# Patient Record
Sex: FEMALE | Race: BLACK OR AFRICAN AMERICAN | NOT HISPANIC OR LATINO | Employment: OTHER | URBAN - METROPOLITAN AREA
[De-identification: names, ages, dates, MRNs, and addresses within clinical notes are randomized per-mention and may not be internally consistent; named-entity substitution may affect disease eponyms.]

---

## 2017-08-08 PROBLEM — Z12.11 SCREENING FOR COLORECTAL CANCER: Status: ACTIVE | Noted: 2017-08-08

## 2017-08-08 PROBLEM — Z12.12 SCREENING FOR COLORECTAL CANCER: Status: ACTIVE | Noted: 2017-08-08

## 2018-08-13 ENCOUNTER — HOSPITAL ENCOUNTER (OUTPATIENT)
Dept: RADIOLOGY | Facility: HOSPITAL | Age: 53
Discharge: HOME | End: 2018-08-13
Attending: NURSE PRACTITIONER
Payer: MEDICARE

## 2018-08-13 DIAGNOSIS — R16.0 LIVER MASS: ICD-10-CM

## 2018-08-13 PROCEDURE — 76705 ECHO EXAM OF ABDOMEN: CPT | Mod: TC

## 2018-08-13 PROCEDURE — 76705 ECHO EXAM OF ABDOMEN: CPT | Mod: 26,,, | Performed by: RADIOLOGY

## 2019-01-25 PROBLEM — J40 BRONCHITIS: Status: ACTIVE | Noted: 2019-01-25

## 2019-12-18 PROBLEM — K44.9 HIATAL HERNIA: Status: ACTIVE | Noted: 2019-12-18

## 2020-01-26 PROBLEM — S39.012S STRAIN OF LUMBAR PARASPINAL MUSCLE, SEQUELA: Status: ACTIVE | Noted: 2020-01-26

## 2020-05-22 PROBLEM — S39.012A STRAIN OF LUMBAR PARASPINAL MUSCLE, INITIAL ENCOUNTER: Status: ACTIVE | Noted: 2020-01-26

## 2023-03-24 ENCOUNTER — OFFICE VISIT (OUTPATIENT)
Dept: GYNECOLOGY | Facility: CLINIC | Age: 58
End: 2023-03-24
Payer: MEDICARE

## 2023-03-24 VITALS
HEIGHT: 68 IN | SYSTOLIC BLOOD PRESSURE: 120 MMHG | BODY MASS INDEX: 29.4 KG/M2 | TEMPERATURE: 98 F | OXYGEN SATURATION: 99 % | DIASTOLIC BLOOD PRESSURE: 77 MMHG | HEART RATE: 73 BPM | WEIGHT: 194 LBS | RESPIRATION RATE: 18 BRPM

## 2023-03-24 DIAGNOSIS — N39.0 FREQUENT UTI: ICD-10-CM

## 2023-03-24 DIAGNOSIS — N89.8 VAGINAL DISCHARGE: ICD-10-CM

## 2023-03-24 DIAGNOSIS — Z12.31 VISIT FOR SCREENING MAMMOGRAM: ICD-10-CM

## 2023-03-24 DIAGNOSIS — Z01.419 ENCOUNTER FOR ANNUAL ROUTINE GYNECOLOGICAL EXAMINATION: Primary | ICD-10-CM

## 2023-03-24 DIAGNOSIS — R82.90 ABNORMAL URINE ODOR: ICD-10-CM

## 2023-03-24 DIAGNOSIS — R23.2 HOT FLASHES: ICD-10-CM

## 2023-03-24 LAB
APPEARANCE UR: CLEAR
BACTERIA #/AREA URNS AUTO: ABNORMAL /HPF
BILIRUB UR QL STRIP.AUTO: NEGATIVE MG/DL
BILIRUB UR QL STRIP: NEGATIVE
CLUE CELLS VAG QL WET PREP: NORMAL
COLOR UR AUTO: ABNORMAL
GLUCOSE UR QL STRIP.AUTO: NORMAL MG/DL
GLUCOSE UR QL STRIP: NEGATIVE
HYALINE CASTS #/AREA URNS LPF: ABNORMAL /LPF
KETONES UR QL STRIP.AUTO: NEGATIVE MG/DL
KETONES UR QL STRIP: NEGATIVE
LEUKOCYTE ESTERASE UR QL STRIP.AUTO: NEGATIVE UNIT/L
LEUKOCYTE ESTERASE UR QL STRIP: NEGATIVE
MUCOUS THREADS URNS QL MICRO: ABNORMAL /LPF
NITRITE UR QL STRIP.AUTO: NEGATIVE
PH UR STRIP.AUTO: 6 [PH]
PH, POC UA: 6
POC BLOOD, URINE: POSITIVE
POC NITRATES, URINE: NEGATIVE
PROT UR QL STRIP.AUTO: NEGATIVE MG/DL
PROT UR QL STRIP: NEGATIVE
RBC #/AREA URNS AUTO: ABNORMAL /HPF
RBC UR QL AUTO: NEGATIVE UNIT/L
SP GR UR STRIP.AUTO: 1.02
SP GR UR STRIP: 1.02 (ref 1–1.03)
SQUAMOUS #/AREA URNS LPF: ABNORMAL /HPF
T VAGINALIS VAG QL WET PREP: NORMAL
UROBILINOGEN UR STRIP-ACNC: 0.2 (ref 0.1–1.1)
UROBILINOGEN UR STRIP-ACNC: NORMAL MG/DL
WBC #/AREA URNS AUTO: ABNORMAL /HPF
WBC #/AREA VAG WET PREP: NORMAL
YEAST SPEC QL WET PREP: NORMAL

## 2023-03-24 PROCEDURE — 3078F DIAST BP <80 MM HG: CPT | Mod: CPTII,,, | Performed by: NURSE PRACTITIONER

## 2023-03-24 PROCEDURE — 3078F PR MOST RECENT DIASTOLIC BLOOD PRESSURE < 80 MM HG: ICD-10-PCS | Mod: CPTII,,, | Performed by: NURSE PRACTITIONER

## 2023-03-24 PROCEDURE — 1159F PR MEDICATION LIST DOCUMENTED IN MEDICAL RECORD: ICD-10-PCS | Mod: CPTII,,, | Performed by: NURSE PRACTITIONER

## 2023-03-24 PROCEDURE — 3008F PR BODY MASS INDEX (BMI) DOCUMENTED: ICD-10-PCS | Mod: CPTII,,, | Performed by: NURSE PRACTITIONER

## 2023-03-24 PROCEDURE — 81003 URINALYSIS AUTO W/O SCOPE: CPT | Mod: PBBFAC | Performed by: NURSE PRACTITIONER

## 2023-03-24 PROCEDURE — 99386 PREV VISIT NEW AGE 40-64: CPT | Mod: S$PBB,,, | Performed by: NURSE PRACTITIONER

## 2023-03-24 PROCEDURE — 81001 URINALYSIS AUTO W/SCOPE: CPT | Performed by: NURSE PRACTITIONER

## 2023-03-24 PROCEDURE — 3074F SYST BP LT 130 MM HG: CPT | Mod: CPTII,,, | Performed by: NURSE PRACTITIONER

## 2023-03-24 PROCEDURE — 87210 SMEAR WET MOUNT SALINE/INK: CPT | Performed by: NURSE PRACTITIONER

## 2023-03-24 PROCEDURE — 3008F BODY MASS INDEX DOCD: CPT | Mod: CPTII,,, | Performed by: NURSE PRACTITIONER

## 2023-03-24 PROCEDURE — 3074F PR MOST RECENT SYSTOLIC BLOOD PRESSURE < 130 MM HG: ICD-10-PCS | Mod: CPTII,,, | Performed by: NURSE PRACTITIONER

## 2023-03-24 PROCEDURE — 99386 PR PREVENTIVE VISIT,NEW,40-64: ICD-10-PCS | Mod: S$PBB,,, | Performed by: NURSE PRACTITIONER

## 2023-03-24 PROCEDURE — 1159F MED LIST DOCD IN RCRD: CPT | Mod: CPTII,,, | Performed by: NURSE PRACTITIONER

## 2023-03-24 PROCEDURE — 99215 OFFICE O/P EST HI 40 MIN: CPT | Mod: PBBFAC | Performed by: NURSE PRACTITIONER

## 2023-03-24 RX ORDER — FLUTICASONE PROPIONATE 50 MCG
SPRAY, SUSPENSION (ML) NASAL
COMMUNITY
Start: 2022-10-02

## 2023-03-24 RX ORDER — ALBUTEROL SULFATE 90 UG/1
2 AEROSOL, METERED RESPIRATORY (INHALATION) EVERY 4 HOURS PRN
COMMUNITY
Start: 2023-01-21

## 2023-03-24 RX ORDER — ALBUTEROL SULFATE 0.83 MG/ML
2.5 SOLUTION RESPIRATORY (INHALATION) EVERY 4 HOURS PRN
COMMUNITY
Start: 2023-01-28 | End: 2024-01-28

## 2023-03-24 RX ORDER — ALBUTEROL SULFATE 0.83 MG/ML
2.5 SOLUTION RESPIRATORY (INHALATION) EVERY 4 HOURS PRN
COMMUNITY
Start: 2023-01-28

## 2023-03-24 RX ORDER — DOXYCYCLINE 100 MG/1
100 CAPSULE ORAL 2 TIMES DAILY
COMMUNITY
Start: 2023-03-15 | End: 2023-03-24

## 2023-03-24 RX ORDER — DOXYCYCLINE 100 MG/1
100 CAPSULE ORAL
COMMUNITY
Start: 2023-03-15 | End: 2023-03-24

## 2023-03-24 RX ORDER — ALBUTEROL SULFATE 5 MG/ML
2.5 SOLUTION RESPIRATORY (INHALATION) EVERY 4 HOURS PRN
COMMUNITY
Start: 2022-09-27

## 2023-03-24 RX ORDER — DEXAMETHASONE 4 MG/1
4 TABLET ORAL EVERY MORNING
COMMUNITY
Start: 2023-01-21

## 2023-03-24 RX ORDER — ALBUTEROL SULFATE 5 MG/ML
SOLUTION RESPIRATORY (INHALATION)
COMMUNITY
Start: 2022-10-18

## 2023-03-24 RX ORDER — DIAZEPAM 10 MG/1
TABLET ORAL
COMMUNITY
Start: 2023-03-23

## 2023-03-24 NOTE — PROGRESS NOTES
"  Subjective:       Patient ID: Lilian Gordon is a 58 y.o. female.    Chief Complaint:  Gynecologic Exam      History of Present Illness  The patient  here for annual exam. Pt had total hysterectomy for uterine fibroids. Denies history of abnormal paps. MG- 21 & BIRADS 2. Denies breast complaints. Pt admits to hx of frequent UTIs, today c/o cloudy urine and odor, denies dysuria. Denies pelvic pain, abnormal bleeding or discharge. Pt reports no STIs in the past and no concerns. Hx of PE in  following cholecystectomy, used coumadin for 8 months. Pt c/o night sweats 3-4x/week over last 2 months, also c/o low sex drive. She used HRT in the past prior to PE. Denies tobacco use. Dep. screening 0. Denies fly hx of breast, uterine or colon cancer. Daughter had ovarian cancer in 20s. Colonoscopy in 2017. PCP is Dr. Sandhu.    GYN & OB History  No LMP recorded (lmp unknown). Patient has had a hysterectomy.     Review of patient's allergies indicates:   Allergen Reactions    Penicillins Hives     Past Medical History:   Diagnosis Date    Anxiety     Arthritis     left knee and right shoulder    Cholelithiasis     Depression     GERD (gastroesophageal reflux disease)     Liver lesion     Liver lesion     Other pulmonary embolism without acute cor pulmonale     Popliteal cyst     Pulmonary embolism     Vitamin D deficiency      OB History    Para Term  AB Living   4 3 2 1 1 1   SAB IAB Ectopic Multiple Live Births   1       3      # Outcome Date GA Lbr Godwin/2nd Weight Sex Delivery Anes PTL Lv   4             3 SAB            2 Term            1 Term                 Review of Systems  Review of Systems    Negative except for pertinent findings for positives per HPI     Objective:    Physical Exam    /77 (BP Location: Right arm, Patient Position: Sitting, BP Method: Medium (Automatic))   Pulse 73   Temp 98.3 °F (36.8 °C) (Oral)   Resp 18   Ht 5' 8" (1.727 m)   Wt 88 kg (194 lb)   " LMP  (LMP Unknown)   SpO2 99%   BMI 29.50 kg/m²   GENERAL: Well-developed female in no acute distress.  SKIN: Normal to inspection,warm, dry and intact.  BREASTS: No masses, lumps, discharge, tenderness.  VULVA: General appearance WNL; external genitalia with no lesions or erythema.  BIMANUAL EXAM: Atrophic vaginal mucosa, Vaginal cuff intact. Uterus/Cervix surgically absent. Ovaries surgically absent. Umang adnexa reveal no evidence of masses, tenderness, or nodularity.  PSYCHIATRIC: Patient is oriented to person, place, and time. Mood and affect are normal.    Assessment:         1. Encounter for annual routine gynecological examination    2. Visit for screening mammogram    3. Vaginal discharge    4. Abnormal urine odor    5. Hot flashes    6. Frequent UTI         Plan:   Lilian was seen today for gynecologic exam.    Diagnoses and all orders for this visit:    Encounter for annual routine gynecological examination    Visit for screening mammogram  -     Mammo Digital Screening Bilat w/ Gigi; Future    Vaginal discharge  -     Wet Prep, Genital    Abnormal urine odor  -     POCT Urinalysis, Dipstick, Automated, W/O Scope  -     Ambulatory referral/consult to Urology; Future  -     Urinalysis, Reflex to Urine Culture    Hot flashes    Frequent UTI  -     POCT Urinalysis, Dipstick, Automated, W/O Scope  -     Ambulatory referral/consult to Urology; Future  -     Urinalysis, Reflex to Urine Culture    Pelvic today, pap deferred d/t hysterectomy  MG ordered  Wet prep for vaginal discharge  Urology referral for hx of frequent UTIs  Refer to GYN for hot flashes and low libido, hx of PE.  Follow up in about 1 year (around 3/24/2024) for annual exam.

## 2023-04-05 ENCOUNTER — HOSPITAL ENCOUNTER (OUTPATIENT)
Dept: RADIOLOGY | Facility: HOSPITAL | Age: 58
Discharge: HOME | End: 2023-04-05
Attending: NURSE PRACTITIONER
Payer: MEDICARE

## 2023-04-05 DIAGNOSIS — Z12.31 VISIT FOR SCREENING MAMMOGRAM: ICD-10-CM

## 2023-04-05 PROCEDURE — 77067 MAMMO DIGITAL SCREENING BILAT WITH TOMO: ICD-10-PCS | Mod: 26,,, | Performed by: RADIOLOGY

## 2023-04-05 PROCEDURE — 77067 SCR MAMMO BI INCL CAD: CPT | Mod: 26,,, | Performed by: RADIOLOGY

## 2023-04-05 PROCEDURE — 77067 SCR MAMMO BI INCL CAD: CPT | Mod: TC

## 2023-04-05 PROCEDURE — 77063 BREAST TOMOSYNTHESIS BI: CPT | Mod: 26,,, | Performed by: RADIOLOGY

## 2023-04-05 PROCEDURE — 77063 MAMMO DIGITAL SCREENING BILAT WITH TOMO: ICD-10-PCS | Mod: 26,,, | Performed by: RADIOLOGY

## 2023-06-02 ENCOUNTER — OFFICE VISIT (OUTPATIENT)
Dept: UROLOGY | Facility: CLINIC | Age: 58
End: 2023-06-02
Payer: MEDICARE

## 2023-06-02 VITALS
HEART RATE: 64 BPM | HEIGHT: 68 IN | RESPIRATION RATE: 18 BRPM | WEIGHT: 192.19 LBS | OXYGEN SATURATION: 98 % | DIASTOLIC BLOOD PRESSURE: 79 MMHG | BODY MASS INDEX: 29.13 KG/M2 | SYSTOLIC BLOOD PRESSURE: 138 MMHG

## 2023-06-02 DIAGNOSIS — N30.90 RECURRENT CYSTITIS: ICD-10-CM

## 2023-06-02 DIAGNOSIS — R31.21 ASYMPTOMATIC MICROSCOPIC HEMATURIA: ICD-10-CM

## 2023-06-02 LAB
BILIRUB SERPL-MCNC: NEGATIVE MG/DL
BLOOD URINE, POC: NORMAL
COLOR, POC UA: YELLOW
GLUCOSE UR QL STRIP: NEGATIVE
KETONES UR QL STRIP: NEGATIVE
LEUKOCYTE ESTERASE URINE, POC: NEGATIVE
NITRITE, POC UA: NEGATIVE
PH, POC UA: 6
PROTEIN, POC: NEGATIVE
SPECIFIC GRAVITY, POC UA: 1.02
UROBILINOGEN, POC UA: 0.2

## 2023-06-02 PROCEDURE — 81001 URINALYSIS AUTO W/SCOPE: CPT | Mod: PBBFAC | Performed by: UROLOGY

## 2023-06-02 PROCEDURE — 3078F PR MOST RECENT DIASTOLIC BLOOD PRESSURE < 80 MM HG: ICD-10-PCS | Mod: CPTII,,, | Performed by: UROLOGY

## 2023-06-02 PROCEDURE — 1159F PR MEDICATION LIST DOCUMENTED IN MEDICAL RECORD: ICD-10-PCS | Mod: CPTII,,, | Performed by: UROLOGY

## 2023-06-02 PROCEDURE — 99204 PR OFFICE/OUTPT VISIT, NEW, LEVL IV, 45-59 MIN: ICD-10-PCS | Mod: S$PBB,,, | Performed by: UROLOGY

## 2023-06-02 PROCEDURE — 3075F SYST BP GE 130 - 139MM HG: CPT | Mod: CPTII,,, | Performed by: UROLOGY

## 2023-06-02 PROCEDURE — 99204 OFFICE O/P NEW MOD 45 MIN: CPT | Mod: S$PBB,,, | Performed by: UROLOGY

## 2023-06-02 PROCEDURE — 3075F PR MOST RECENT SYSTOLIC BLOOD PRESS GE 130-139MM HG: ICD-10-PCS | Mod: CPTII,,, | Performed by: UROLOGY

## 2023-06-02 PROCEDURE — 1159F MED LIST DOCD IN RCRD: CPT | Mod: CPTII,,, | Performed by: UROLOGY

## 2023-06-02 PROCEDURE — 3008F BODY MASS INDEX DOCD: CPT | Mod: CPTII,,, | Performed by: UROLOGY

## 2023-06-02 PROCEDURE — 3008F PR BODY MASS INDEX (BMI) DOCUMENTED: ICD-10-PCS | Mod: CPTII,,, | Performed by: UROLOGY

## 2023-06-02 PROCEDURE — 87088 URINE BACTERIA CULTURE: CPT | Performed by: UROLOGY

## 2023-06-02 PROCEDURE — 3078F DIAST BP <80 MM HG: CPT | Mod: CPTII,,, | Performed by: UROLOGY

## 2023-06-02 PROCEDURE — 1160F PR REVIEW ALL MEDS BY PRESCRIBER/CLIN PHARMACIST DOCUMENTED: ICD-10-PCS | Mod: CPTII,,, | Performed by: UROLOGY

## 2023-06-02 PROCEDURE — 1160F RVW MEDS BY RX/DR IN RCRD: CPT | Mod: CPTII,,, | Performed by: UROLOGY

## 2023-06-02 PROCEDURE — 99215 OFFICE O/P EST HI 40 MIN: CPT | Mod: PBBFAC | Performed by: UROLOGY

## 2023-06-02 NOTE — PROGRESS NOTES
Pt seen by Dr. Duarte. Orders for urine culture will be placed by provider. Pt will be notified of results. Pt will return to clinic for cysto after CT. Pt education given both written and verbal.

## 2023-06-02 NOTE — PROGRESS NOTES
CC:  Recurrent urinary tract infections    HPI:  Lilian Gordon is a 58 y.o. female seen in consultation for recurrent urinary tract infections.  She has had problems with recurrent urinary tract infections for the past couple of years.  She states that they occur about every six months.  Symptoms are usually a foul smell to the urine and cloudy urine.  She had a positive urine culture in November of 2022 at urgent care.  She thinks she may infection March 2023 however there was no urine culture done just vaginal cultures and those were negative.  She feels like she may have a urinary tract infection today.         She was found have some microscopic hematuria on her recent urinalysis.  She had a workup for this about 20 years ago but then was not told that it was there so she assumed resolved.  She has a minimal smoking history of one pack per week for about six months to a year.      Urinalysis:  Results for orders placed or performed in visit on 06/02/23   POCT URINE DIPSTICK WITH MICROSCOPE, AUTOMATED   Result Value Ref Range    Color, UA Yellow     Spec Grav UA 1.025     pH, UA 6.0     WBC, UA Negative     Nitrite, UA Negative     Protein, POC Negative     Glucose, UA Negative     Ketones, UA Negative     Urobilinogen, UA 0.2     Bilirubin, POC Negative     Blood, UA Trace-intact         Microscopic                    1-2 epithelial cells, 1-3 RBC per HPF    Lab Results:  See HPI.    Imaging:  I reviewed her imaging over the last few years and there is nothing that shows the urinary tract.      Data Review:  Note from referring provider, Josey Suggs MD dated 24 March 2023.  Urine culture.  Urinalysis.  Vaginal cultures.    ROS:  All systems reviewed and are negative except as documented in HPI and/or Assessment and Plan.     Patient Active Problem List:     Patient Active Problem List   Diagnosis    Liver mass    Situational anxiety    GERD (gastroesophageal reflux disease)    Postmenopausal symptoms     Synovial cyst of left popliteal space    Primary osteoarthritis of left knee    Primary osteoarthritis of right shoulder    Preop cardiovascular exam    Sprain of right ankle    Tear of meniscus of left knee    Screening for colorectal cancer    Bronchitis    Hiatal hernia    Strain of lumbar paraspinal muscle, initial encounter        Past Medical History:  Past Medical History:   Diagnosis Date    Anxiety     Arthritis     left knee and right shoulder    Cholelithiasis     Depression     GERD (gastroesophageal reflux disease)     Liver lesion     Liver lesion     Other pulmonary embolism without acute cor pulmonale     Popliteal cyst     Pulmonary embolism     Vitamin D deficiency         Past Surgical History:  Past Surgical History:   Procedure Laterality Date    CHOLECYSTECTOMY      COLONOSCOPY      COLONOSCOPY N/A 08/08/2017    Procedure: COLONOSCOPY;  Surgeon: Balbina Bailey MD;  Location: [facility];  Service: Endoscopy;  Laterality: N/A;    HERNIA REPAIR      umbilical at age 8    HYSTERECTOMY  01/01/1997    fibroids    LIVER BIOPSY      OOPHORECTOMY  01/01/2010    b/l cysts    PULMONARY EMBOLISM SURGERY      UPPER GASTROINTESTINAL ENDOSCOPY          Family History:  Family History   Problem Relation Age of Onset    Stroke Mother     Cancer Father         lung     Hypertension Sister     Arthritis Sister     No Known Problems Brother     No Known Problems Maternal Aunt     No Known Problems Maternal Uncle     No Known Problems Paternal Aunt     No Known Problems Paternal Uncle     No Known Problems Maternal Grandmother     No Known Problems Maternal Grandfather     No Known Problems Paternal Grandmother     No Known Problems Paternal Grandfather     Ovarian cancer Daughter     Lupus Sister     Celiac disease Neg Hx     Cirrhosis Neg Hx     Colon cancer Neg Hx     Colon polyps Neg Hx     Crohn's disease Neg Hx     Cystic fibrosis Neg Hx     Esophageal cancer Neg Hx     Hemochromatosis Neg Hx      Inflammatory bowel disease Neg Hx     Irritable bowel syndrome Neg Hx     Liver cancer Neg Hx     Liver disease Neg Hx     Rectal cancer Neg Hx     Stomach cancer Neg Hx     Ulcerative colitis Neg Hx     Bradley's disease Neg Hx         Social History:  Social History     Socioeconomic History    Marital status: Single    Number of children: 1    Years of education: college   Tobacco Use    Smoking status: Former     Packs/day: 0.10     Years: 1.00     Pack years: 0.10     Types: Cigarettes     Start date: 1993     Quit date: 1994     Years since quittin.5    Smokeless tobacco: Never    Tobacco comments:     pk would last about 1 week   Substance and Sexual Activity    Alcohol use: Never    Drug use: Never    Sexual activity: Not Currently     Partners: Male     Birth control/protection: Surgical     Comment: with one partner for 31 years        Allergies:  Review of patient's allergies indicates:   Allergen Reactions    Penicillins Hives        Objective:  Vitals:    23 0854   BP: 138/79   Pulse: 64   Resp: 18     General:  Well developed, well nourished adult female in no acute distress  Abdomen: Soft, nontender, no masses  Extremities:  No clubbing, cyanosis, or edema  Neurologic:  Grossly intact  Musculoskeletal:  Normal tone    Assessment:  1. Recurrent cystitis  - Ambulatory referral/consult to Urology  - Urine culture  - CT Abdomen Pelvis W Wo Contrast; Future    2. Asymptomatic microscopic hematuria  - Ambulatory referral/consult to Urology  - POCT URINE DIPSTICK WITH MICROSCOPE, AUTOMATED  - Urine culture  - CT Abdomen Pelvis W Wo Contrast; Future     Plan:  Urine culture was sent today.  CT scan ordered.  We will repeat the workup for microscopic hematuria with a CT scan and cystoscopy.    Follow-up:    After CT for cystoscopy.

## 2023-06-04 LAB — BACTERIA UR CULT: NORMAL

## 2023-06-09 ENCOUNTER — OFFICE VISIT (OUTPATIENT)
Dept: GYNECOLOGY | Facility: CLINIC | Age: 58
End: 2023-06-09
Payer: MEDICARE

## 2023-06-09 VITALS — WEIGHT: 196.19 LBS | BODY MASS INDEX: 29.83 KG/M2

## 2023-06-09 DIAGNOSIS — N95.9 POSTMENOPAUSAL SYMPTOMS: ICD-10-CM

## 2023-06-09 DIAGNOSIS — R82.90 MALODOROUS URINE: Primary | ICD-10-CM

## 2023-06-09 LAB
APPEARANCE UR: CLEAR
BACTERIA #/AREA URNS AUTO: ABNORMAL /HPF
BILIRUB SERPL-MCNC: ABNORMAL MG/DL
BILIRUB UR QL STRIP.AUTO: NEGATIVE MG/DL
BLOOD URINE, POC: ABNORMAL
CLARITY, POC UA: ABNORMAL
COLOR UR: ABNORMAL
COLOR, POC UA: ABNORMAL
GLUCOSE UR QL STRIP.AUTO: NORMAL MG/DL
GLUCOSE UR QL STRIP: ABNORMAL
HYALINE CASTS #/AREA URNS LPF: ABNORMAL /LPF
KETONES UR QL STRIP.AUTO: NEGATIVE MG/DL
KETONES UR QL STRIP: ABNORMAL
LEUKOCYTE ESTERASE UR QL STRIP.AUTO: NEGATIVE UNIT/L
LEUKOCYTE ESTERASE URINE, POC: ABNORMAL
MUCOUS THREADS URNS QL MICRO: ABNORMAL /LPF
NITRITE UR QL STRIP.AUTO: NEGATIVE
NITRITE, POC UA: ABNORMAL
PH UR STRIP.AUTO: 5.5 [PH]
PH, POC UA: 5
PROT UR QL STRIP.AUTO: NEGATIVE MG/DL
PROTEIN, POC: ABNORMAL
RBC #/AREA URNS AUTO: ABNORMAL /HPF
RBC UR QL AUTO: NEGATIVE UNIT/L
SP GR UR STRIP.AUTO: 1.02
SPECIFIC GRAVITY, POC UA: 1.02
SQUAMOUS #/AREA URNS LPF: ABNORMAL /HPF
UROBILINOGEN UR STRIP-ACNC: NORMAL MG/DL
UROBILINOGEN, POC UA: 0.2
WBC #/AREA URNS AUTO: ABNORMAL /HPF

## 2023-06-09 PROCEDURE — 99211 OFF/OP EST MAY X REQ PHY/QHP: CPT | Mod: PBBFAC

## 2023-06-09 PROCEDURE — 81002 URINALYSIS NONAUTO W/O SCOPE: CPT | Mod: PBBFAC

## 2023-06-09 PROCEDURE — 81001 URINALYSIS AUTO W/SCOPE: CPT

## 2023-06-09 NOTE — PROGRESS NOTES
[facility] GYNECOLOGY CLINIC NOTE     Lilian Gordon is a 58 y.o.  presenting to GYN clinic for management of postmenopausal symptoms. She endorses hot flashes (3-4x per night), mood swings, and weight fluctuation for the past month and decreased libido for the past 4 years. She does not currently have a sexual partner. She was last sexually active 2mo ago, at which time she noted minimal vaginal dryness. She underwent menopause at age 50-51 and denies postmenopausal bleeding, as well as personal and family h/o breast, ovarian, uterine, and colon cancers. Of note, she has a h/o a provoked PE s/p cholecystectomy in , for which she took a blood thinners for 8 months.    Gynecology  Menopause at age 50-51  Denies h/o abnormal pap smears and STIs    OB History          4    Para   3    Term   2       1    AB   1    Living   1         SAB   1    IAB        Ectopic        Multiple        Live Births   3                Past Medical History:   Diagnosis Date    Anxiety     Arthritis     left knee and right shoulder    Cholelithiasis     Depression     GERD (gastroesophageal reflux disease)     Liver lesion     Liver lesion     Other pulmonary embolism without acute cor pulmonale     Popliteal cyst     Pulmonary embolism     Vitamin D deficiency       Past Surgical History:   Procedure Laterality Date    CHOLECYSTECTOMY      COLONOSCOPY      COLONOSCOPY N/A 2017    Procedure: COLONOSCOPY;  Surgeon: Balbina Bailey MD;  Location: [facility];  Service: Endoscopy;  Laterality: N/A;    HERNIA REPAIR      umbilical at age 8    HYSTERECTOMY  1997    KAILASH for fibroids    LIVER BIOPSY      OOPHORECTOMY  2010    b/l cysts    PULMONARY EMBOLISM SURGERY      UPPER GASTROINTESTINAL ENDOSCOPY        Current Outpatient Medications   Medication Instructions    albuterol (PROVENTIL) 5 mg/mL nebulizer solution SMARTSI.5 Milliliter(s) Via Nebulizer Every 4 Hours PRN    albuterol (PROVENTIL) 2.5  mg, Inhalation, Every 4 hours PRN    albuterol (PROVENTIL) 2.5 mg, Inhalation, Every 4 hours PRN    albuterol (PROVENTIL) 2.5 mg, Nebulization, Every 4 hours PRN    albuterol (PROVENTIL/VENTOLIN HFA) 90 mcg/actuation inhaler 2 puffs, Inhalation, Every 4 hours PRN    albuterol (PROVENTIL/VENTOLIN HFA) 90 mcg/actuation inhaler 2 puffs, Inhalation, Every 4 hours PRN    ascorbic acid (vitamin C) (VITAMIN C) 1,000 mg, Oral, Daily    buPROPion (WELLBUTRIN XL) 150 mg, Oral, Daily    cyanocobalamin, vitamin B-12, 1,000 mcg TbSR 1 tablet, Oral, Daily    cyclobenzaprine (FLEXERIL) 10 mg, Oral, 3 times daily PRN    dexAMETHasone (DECADRON) 4 mg, Oral, Every morning    diazePAM (VALIUM) 10 MG Tab 1 PO 1 hour prior to MRI    diclofenac sodium (VOLTAREN) 2 g, Topical (Top), 4 times daily PRN    famotidine (PEPCID) 20 mg, Oral, 2 times daily PRN    fluticasone propionate (FLONASE) 50 mcg/actuation nasal spray Each Nostril    ibuprofen (ADVIL,MOTRIN) 800 mg, Oral, 3 times daily PRN    Lactobacillus rhamnosus GG (CULTURELLE) 10 billion cell capsule 1 capsule, Oral, Daily    loratadine (CLARITIN) 10 mg, Oral, Daily    pantoprazole (PROTONIX) 40 MG tablet TAKE 1 TABLET(40 MG) BY MOUTH TWICE DAILY FOR 3 DAYS    promethazine (PHENERGAN) 12.5-25 mg, Oral, Every 6 hours PRN    sucralfate (CARAFATE) 1 gram tablet TAKE 1 TABLET(1 GRAM) BY MOUTH FOUR TIMES DAILY BEFORE MEALS AND AT NIGHT     Social History     Tobacco Use    Smoking status: Former     Packs/day: 0.10     Years: 1.00     Pack years: 0.10     Types: Cigarettes     Start date: 1993     Quit date: 1994     Years since quittin.5    Smokeless tobacco: Never    Tobacco comments:     pk would last about 1 week   Substance Use Topics    Alcohol use: Never    Drug use: Never     Review of Systems  Pertinent items are noted in HPI.     Objective:     Wt 89 kg (196 lb 3.2 oz)   LMP  (LMP Unknown)   BMI 29.83 kg/m²   Physical Exam:  Gen: Well-nourished, well-developed  female appearing stated age. Alert, cooperative, in no acute distress.  CV: regular rate  Chest: no conversational dyspnea  Abdomen: Soft, non-tender, no masses.  Incisions: Pfannenstiel incision  Extremities: Extremities normal, atraumatic, non-tender calves.    Assessment:       58 y.o.  here for medical management of postmenopausal symptoms with a h/o provoked DVT.    1. Malodorous urine  Urinalysis, Reflex to Urine Culture    POCT URINE DIPSTICK WITHOUT MICROSCOPE      2. Postmenopausal symptoms          Plan:     - due to personal h/o DVT, hormone replacement therapy is contraindicated  - given decreased libido, will trial Bupropion for treatment of vasomotor symptoms, mood swings, and loss of libido  - plan to reassess symptoms in 3 months during telemedicine visit    - patient also following with Dr. Duarte for recurrent UTIs  - UA ordered as patient endorses malodorous urine    Problem List Items Addressed This Visit          Renal/    Postmenopausal symptoms     Other Visit Diagnoses       Malodorous urine    -  Primary    Relevant Orders    Urinalysis, Reflex to Urine Culture (Completed)    POCT URINE DIPSTICK WITHOUT MICROSCOPE (Completed)          Return to clinic in 3 months for telemedicine follow-up visit    Discussed patient and plan with Dr. Jean.    Pilar Spain MD  [employer] [facility], PGY3

## 2023-06-12 RX ORDER — BUPROPION HYDROCHLORIDE 150 MG/1
150 TABLET ORAL DAILY
Qty: 30 TABLET | Refills: 5 | Status: SHIPPED | OUTPATIENT
Start: 2023-06-12 | End: 2023-12-09

## 2023-06-13 ENCOUNTER — HOSPITAL ENCOUNTER (OUTPATIENT)
Dept: RADIOLOGY | Facility: HOSPITAL | Age: 58
Discharge: HOME | End: 2023-06-13
Attending: UROLOGY
Payer: MEDICARE

## 2023-06-13 DIAGNOSIS — N30.90 RECURRENT CYSTITIS: ICD-10-CM

## 2023-06-13 DIAGNOSIS — R31.21 ASYMPTOMATIC MICROSCOPIC HEMATURIA: ICD-10-CM

## 2023-06-13 LAB
CREAT SERPL-MCNC: 0.73 MG/DL (ref 0.55–1.02)
GFR SERPLBLD CREATININE-BSD FMLA CKD-EPI: >60 MLS/MIN/1.73/M2

## 2023-06-13 PROCEDURE — 74178 CT ABD&PLV WO CNTR FLWD CNTR: CPT | Mod: TC

## 2023-06-13 PROCEDURE — 25500020 PHARM REV CODE 255: Performed by: UROLOGY

## 2023-06-13 PROCEDURE — 82565 ASSAY OF CREATININE: CPT | Performed by: UROLOGY

## 2023-06-13 RX ADMIN — IOHEXOL 100 ML: 350 INJECTION, SOLUTION INTRAVENOUS at 11:06

## 2023-06-28 ENCOUNTER — TELEPHONE (OUTPATIENT)
Dept: GYNECOLOGY | Facility: CLINIC | Age: 58
End: 2023-06-28
Payer: MEDICARE

## 2023-06-28 NOTE — TELEPHONE ENCOUNTER
I moved up patients telemed with us to 7/14/23 at 8 AM. Please call patient and let her know. That is the soonest I could squeeze her in. Thank you.     ----- Message from Zaria Coleman MA sent at 6/28/2023  2:23 PM CDT -----  Regarding: FW: Medication  This is the one I was talking about  ----- Message -----  From: Paula Nuñez MD  Sent: 6/27/2023   6:43 PM CDT  To: Zaria Coleman MA  Subject: RE: Medication                                   Please schedule for telehealth within the month. If she is not happy with it she can discontinue it and we can discuss other options at her next visit.    Paula Nuñez  [facility], PGY4    ----- Message -----  From: Zaria Coleman MA  Sent: 6/26/2023  10:57 AM CDT  To: [facility] Residents  Subject: FW: Medication                                   Please Advise.  ----- Message -----  From: Roxy Dodge  Sent: 6/26/2023  10:47 AM CDT  To: [facility] Staff  Subject: Medication                                       The patient above stated that new medication Burprotion is causing her to have cramps and nausea, stopped taking it, and would like to speak to someone about it. Please advise, Thanks.

## 2023-07-14 ENCOUNTER — CLINICAL SUPPORT (OUTPATIENT)
Dept: GYNECOLOGY | Facility: CLINIC | Age: 58
End: 2023-07-14
Payer: MEDICARE

## 2023-07-14 NOTE — PROGRESS NOTES
Called patient. She had prior checked in for telemedicine appointment, however no answer x3 on MD call. Will have office reschedule telemed appointment.

## 2023-08-17 ENCOUNTER — PROCEDURE VISIT (OUTPATIENT)
Dept: UROLOGY | Facility: CLINIC | Age: 58
End: 2023-08-17
Payer: MEDICARE

## 2023-08-17 VITALS
HEIGHT: 68 IN | WEIGHT: 196 LBS | OXYGEN SATURATION: 98 % | RESPIRATION RATE: 18 BRPM | DIASTOLIC BLOOD PRESSURE: 75 MMHG | BODY MASS INDEX: 29.7 KG/M2 | SYSTOLIC BLOOD PRESSURE: 115 MMHG | HEART RATE: 66 BPM

## 2023-08-17 DIAGNOSIS — R31.21 ASYMPTOMATIC MICROSCOPIC HEMATURIA: Primary | ICD-10-CM

## 2023-08-17 DIAGNOSIS — N30.90 RECURRENT CYSTITIS: ICD-10-CM

## 2023-08-17 LAB
BILIRUB SERPL-MCNC: NORMAL MG/DL
BLOOD URINE, POC: NORMAL
COLOR, POC UA: YELLOW
GLUCOSE UR QL STRIP: NORMAL
KETONES UR QL STRIP: NORMAL
LEUKOCYTE ESTERASE URINE, POC: NORMAL
NITRITE, POC UA: NORMAL
PH, POC UA: 6
PROTEIN, POC: NORMAL
SPECIFIC GRAVITY, POC UA: >=1.03
UROBILINOGEN, POC UA: 0.2

## 2023-08-17 PROCEDURE — 99212 PR OFFICE/OUTPT VISIT, EST, LEVL II, 10-19 MIN: ICD-10-PCS | Mod: 25,S$PBB,, | Performed by: UROLOGY

## 2023-08-17 PROCEDURE — 81001 URINALYSIS AUTO W/SCOPE: CPT | Mod: PBBFAC | Performed by: UROLOGY

## 2023-08-17 PROCEDURE — 52000 CYSTOURETHROSCOPY: CPT | Mod: PBBFAC | Performed by: UROLOGY

## 2023-08-17 PROCEDURE — 52000 PR CYSTOURETHROSCOPY: ICD-10-PCS | Mod: S$PBB,,, | Performed by: UROLOGY

## 2023-08-17 PROCEDURE — 52000 CYSTOURETHROSCOPY: CPT | Mod: S$PBB,,, | Performed by: UROLOGY

## 2023-08-17 PROCEDURE — 99212 OFFICE O/P EST SF 10 MIN: CPT | Mod: 25,S$PBB,, | Performed by: UROLOGY

## 2023-08-17 RX ORDER — LIDOCAINE HYDROCHLORIDE 20 MG/ML
JELLY TOPICAL
Status: COMPLETED | OUTPATIENT
Start: 2023-08-17 | End: 2023-08-17

## 2023-08-17 RX ORDER — CIPROFLOXACIN 500 MG/1
500 TABLET ORAL
Status: COMPLETED | OUTPATIENT
Start: 2023-08-17 | End: 2023-08-17

## 2023-08-17 RX ADMIN — CIPROFLOXACIN 500 MG: 500 TABLET ORAL at 09:08

## 2023-08-17 RX ADMIN — LIDOCAINE HYDROCHLORIDE: 20 JELLY TOPICAL at 09:08

## 2023-08-17 NOTE — PROGRESS NOTES
Pt seen by Dr. Cano. Cysto performed in clinic. Consents signed and obtained by staff. Pt received medication per procedure protocol. Ciprofloxacin HCl tablet 500 mg & LIDOcaine HCl 2% urojet administered and tolerated well. RTC 6 months. Pt education given both written and verbal.

## 2023-09-14 NOTE — PROCEDURES
CC:  Cystoscopy    HPI:  Lilian Gordon is a 58 y.o. female here for cystoscopy for hematuria.  She was found to have microscopic hematuria.  She did have a workup for this about 20 years ago which was negative.  She has a minimal smoking history.  She is also been having problems with recurrent urinary tract infections for the last couple of years.  Urine culture on 2 June 2023 was no growth.    Urinalysis:  Results for orders placed or performed in visit on 08/17/23   POCT URINE DIPSTICK WITH MICROSCOPE, AUTOMATED   Result Value Ref Range    Color, UA Yellow     Spec Grav UA >=1.030     pH, UA 6.0     WBC, UA neg     Nitrite, UA pos     Protein, POC neg     Glucose, UA neg     Ketones, UA neg     Urobilinogen, UA 0.2     Bilirubin, POC neg     Blood, UA trace-intact      Microscopic: 2-3 RBC, 3-4 squamous epithelial cells, rare bacteria per HPF      Imaging:  CT - 13 June 2023:  Negative      ROS:  All systems reviewed and are negative except as documented in HPI and/or Assessment and Plan.     Patient Active Problem List:     Patient Active Problem List   Diagnosis    Liver mass    Situational anxiety    GERD (gastroesophageal reflux disease)    Postmenopausal symptoms    Synovial cyst of left popliteal space    Primary osteoarthritis of left knee    Primary osteoarthritis of right shoulder    Preop cardiovascular exam    Sprain of right ankle    Tear of meniscus of left knee    Screening for colorectal cancer    Bronchitis    Hiatal hernia    Strain of lumbar paraspinal muscle, initial encounter        Past Medical History:  Past Medical History:   Diagnosis Date    Anxiety     Arthritis     left knee and right shoulder    Cholelithiasis     Depression     GERD (gastroesophageal reflux disease)     Liver lesion     Liver lesion     Other pulmonary embolism without acute cor pulmonale     Popliteal cyst     Pulmonary embolism     Vitamin D deficiency         Past Surgical History:  Past Surgical History:    Procedure Laterality Date    CHOLECYSTECTOMY      COLONOSCOPY      COLONOSCOPY N/A 2017    Procedure: COLONOSCOPY;  Surgeon: Balbina Bailey MD;  Location: [facility];  Service: Endoscopy;  Laterality: N/A;    HERNIA REPAIR      umbilical at age 8    HYSTERECTOMY  1997    KAILASH for fibroids    LIVER BIOPSY      OOPHORECTOMY  2010    b/l cysts    PULMONARY EMBOLISM SURGERY      UPPER GASTROINTESTINAL ENDOSCOPY          Family History:  Family History   Problem Relation Age of Onset    No Known Problems Paternal Grandfather     No Known Problems Paternal Grandmother     No Known Problems Maternal Grandmother     No Known Problems Maternal Grandfather     Cancer Father         lung     Stroke Mother     No Known Problems Brother     Hypertension Sister     Arthritis Sister     Lupus Sister     Ovarian cancer Daughter     No Known Problems Maternal Aunt     No Known Problems Maternal Uncle     No Known Problems Paternal Aunt     No Known Problems Paternal Uncle     Celiac disease Neg Hx     Cirrhosis Neg Hx     Colon cancer Neg Hx     Colon polyps Neg Hx     Crohn's disease Neg Hx     Cystic fibrosis Neg Hx     Esophageal cancer Neg Hx     Hemochromatosis Neg Hx     Inflammatory bowel disease Neg Hx     Irritable bowel syndrome Neg Hx     Liver cancer Neg Hx     Liver disease Neg Hx     Rectal cancer Neg Hx     Stomach cancer Neg Hx     Ulcerative colitis Neg Hx     Bradley's disease Neg Hx     Breast cancer Neg Hx     Uterine cancer Neg Hx         Social History:  Social History     Socioeconomic History    Marital status: Single    Number of children: 1    Years of education: college   Tobacco Use    Smoking status: Former     Current packs/day: 0.00     Average packs/day: 0.1 packs/day for 1 year (0.1 ttl pk-yrs)     Types: Cigarettes     Start date: 1993     Quit date: 1994     Years since quittin.7    Smokeless tobacco: Never    Tobacco comments:     pk would last about 1 week    Substance and Sexual Activity    Alcohol use: Never    Drug use: Never    Sexual activity: Not Currently     Partners: Male     Birth control/protection: Surgical     Comment: with one partner for 31 years     Social Determinants of Health     Stress: No Stress Concern Present (7/1/2020)    [employer] [place] [employer] - Occupational Stress Questionnaire     Feeling of Stress : Only a little        Allergies:  Review of patient's allergies indicates:   Allergen Reactions    Penicillins Hives        Objective:  Vitals:    08/17/23 0846   BP: 115/75   Pulse: 66   Resp: 18     General:  Well developed, well nourished adult female in no acute distress  Abdomen: Soft, nontender, no masses  Extremities:  No clubbing, cyanosis, or edema  Neurologic:  Grossly intact  Musculoskeletal:  Normal tone  :  External genitalia is normal without lesions.  Vagina is normal.      Cystoscopy:        - Urethral meatus:  No strictures        - Urethra:  Normal without strictures or lesions        - Bladder neck:  Normal        - Bladder:  No mucosal abnormalities        - Ureteral orifices:  On the trigone with clear efflux bilaterally    The patient tolerated the procedure well without complications.  She was given Cipro 500mg, one tablet in the clinic.   The urethra was anesthetized with 2% Lidocaine Jelly, Urojet.      Assessment:  1. Asymptomatic microscopic hematuria  - POCT URINE DIPSTICK WITH MICROSCOPE, AUTOMATED    2. Recurrent cystitis     Plan:  The workup for microscopic hematuria was negative.  We will observe the recurrent urinary tract infections at this time.  If they persist very frequently will consider suppression.    Follow-up:    Six months.

## 2024-02-22 ENCOUNTER — OFFICE VISIT (OUTPATIENT)
Dept: UROLOGY | Facility: CLINIC | Age: 59
End: 2024-02-22
Payer: MEDICARE

## 2024-02-22 VITALS
DIASTOLIC BLOOD PRESSURE: 74 MMHG | TEMPERATURE: 98 F | HEART RATE: 67 BPM | OXYGEN SATURATION: 98 % | HEIGHT: 68 IN | WEIGHT: 191 LBS | BODY MASS INDEX: 28.95 KG/M2 | RESPIRATION RATE: 19 BRPM | SYSTOLIC BLOOD PRESSURE: 117 MMHG

## 2024-02-22 DIAGNOSIS — R31.21 ASYMPTOMATIC MICROSCOPIC HEMATURIA: ICD-10-CM

## 2024-02-22 DIAGNOSIS — N30.90 RECURRENT CYSTITIS: Primary | ICD-10-CM

## 2024-02-22 LAB
BILIRUB SERPL-MCNC: NORMAL MG/DL
BLOOD URINE, POC: NORMAL
COLOR, POC UA: YELLOW
GLUCOSE UR QL STRIP: NORMAL
KETONES UR QL STRIP: NORMAL
LEUKOCYTE ESTERASE URINE, POC: NORMAL
NITRITE, POC UA: NORMAL
PH, POC UA: 6
PROTEIN, POC: NORMAL
SPECIFIC GRAVITY, POC UA: 1.01
UROBILINOGEN, POC UA: 0.2

## 2024-02-22 PROCEDURE — 1159F MED LIST DOCD IN RCRD: CPT | Mod: CPTII,,, | Performed by: NURSE PRACTITIONER

## 2024-02-22 PROCEDURE — 81001 URINALYSIS AUTO W/SCOPE: CPT | Mod: PBBFAC | Performed by: NURSE PRACTITIONER

## 2024-02-22 PROCEDURE — 1160F RVW MEDS BY RX/DR IN RCRD: CPT | Mod: CPTII,,, | Performed by: NURSE PRACTITIONER

## 2024-02-22 PROCEDURE — 99215 OFFICE O/P EST HI 40 MIN: CPT | Mod: PBBFAC | Performed by: NURSE PRACTITIONER

## 2024-02-22 PROCEDURE — 88108 CYTOPATH CONCENTRATE TECH: CPT | Mod: TC | Performed by: NURSE PRACTITIONER

## 2024-02-22 PROCEDURE — 3078F DIAST BP <80 MM HG: CPT | Mod: CPTII,,, | Performed by: NURSE PRACTITIONER

## 2024-02-22 PROCEDURE — 99213 OFFICE O/P EST LOW 20 MIN: CPT | Mod: S$PBB,,, | Performed by: NURSE PRACTITIONER

## 2024-02-22 PROCEDURE — 3074F SYST BP LT 130 MM HG: CPT | Mod: CPTII,,, | Performed by: NURSE PRACTITIONER

## 2024-02-22 PROCEDURE — 3008F BODY MASS INDEX DOCD: CPT | Mod: CPTII,,, | Performed by: NURSE PRACTITIONER

## 2024-02-22 NOTE — PROGRESS NOTES
Patient seen by Helio Wells NP. Patient instructed to RTC in 6 months with urine cytology. Urine sample will be sent to lab for urine cytology today as well.

## 2024-02-22 NOTE — PROGRESS NOTES
Chief Complaint:   Chief Complaint   Patient presents with    Follow up       HPI:  Patient is a 58-year-old female here for follow-up for recurrent urinary tract infections and hematuria.  Patient has a history of recurring UTIs over the past 9 months with symptoms of foul-smelling urine and cloudy urine in November of 2022 she was seen by an urgent care and treated.  Patient felt she had a UTI the following March but there was no urine culture positive results.  Patient also has been having persistent microscopic hematuria she did have workup over 20 years ago but felt it was resolved until her most recent visit with Dr. Diaz.  Patient does had a minimal history of smoking 1 pack a week for the past year, but has quit within the past 2 months.  Patient underwent a cystoscope with Dr. Diaz on 08/17/2023  which was negative cystoscope.  Today patient presents without any symptoms of dysuria, urinary frequency, urinary urgency, urinary hesitancy, urinary retention, urinary incontinence, gross hematuria, nocturia.  Plan is to send urine for cytology will notify patient results and follow patient for recurrent UTIs and may consider suppression therapy if UTIs recur and consistent basis.  Instructed patient RTC 6 months with urine cytology.     Allergies:  Review of patient's allergies indicates:   Allergen Reactions    Bactrim [sulfamethoxazole-trimethoprim] Hives    Penicillins Hives       Medications:  Current Outpatient Medications   Medication Sig Dispense Refill    albuterol (PROVENTIL/VENTOLIN HFA) 90 mcg/actuation inhaler Inhale 2 puffs into the lungs every 4 (four) hours as needed.      ascorbic acid, vitamin C, (VITAMIN C) 1000 MG tablet Take 1,000 mg by mouth once daily.      cyclobenzaprine (FLEXERIL) 10 MG tablet Take 10 mg by mouth 3 (three) times daily as needed for Muscle spasms.      diclofenac sodium (VOLTAREN) 1 % Gel Apply 2 g topically 4 (four) times daily as needed. 100 g 0    fluticasone  propionate (FLONASE) 50 mcg/actuation nasal spray by Each Nostril route.      ibuprofen (ADVIL,MOTRIN) 800 MG tablet Take 1 tablet (800 mg total) by mouth 3 (three) times daily as needed for Pain. 90 tablet 0    pantoprazole (PROTONIX) 40 MG tablet TAKE 1 TABLET(40 MG) BY MOUTH TWICE DAILY FOR 3 DAYS 60 tablet 3    albuterol (PROVENTIL) 2.5 mg /3 mL (0.083 %) nebulizer solution Take 2.5 mg by nebulization every 4 (four) hours as needed.      albuterol (PROVENTIL) 5 mg/mL nebulizer solution Inhale 2.5 mg into the lungs every 4 (four) hours as needed.      albuterol (PROVENTIL) 5 mg/mL nebulizer solution SMARTSI.5 Milliliter(s) Via Nebulizer Every 4 Hours PRN      albuterol (PROVENTIL/VENTOLIN HFA) 90 mcg/actuation inhaler Inhale 2 puffs into the lungs every 4 (four) hours as needed.      buPROPion (WELLBUTRIN XL) 150 MG TB24 tablet Take 1 tablet (150 mg total) by mouth once daily. 30 tablet 5    cyanocobalamin, vitamin B-12, 1,000 mcg TbSR Take 1 tablet by mouth once daily.      dexAMETHasone (DECADRON) 4 MG Tab Take 4 mg by mouth every morning.      diazePAM (VALIUM) 10 MG Tab 1 PO 1 hour prior to MRI      famotidine (PEPCID) 20 MG tablet Take 1 tablet (20 mg total) by mouth 2 (two) times daily as needed for Heartburn. (Patient not taking: Reported on 2021) 60 tablet 11    Lactobacillus rhamnosus GG (CULTURELLE) 10 billion cell capsule Take 1 capsule by mouth once daily.      loratadine (CLARITIN) 10 mg tablet Take 1 tablet (10 mg total) by mouth once daily. (Patient not taking: Reported on 3/24/2023)      promethazine (PHENERGAN) 25 MG tablet Take 0.5-1 tablets (12.5-25 mg total) by mouth every 6 (six) hours as needed for Nausea. (Patient not taking: Reported on 2023) 20 tablet 0    sucralfate (CARAFATE) 1 gram tablet TAKE 1 TABLET(1 GRAM) BY MOUTH FOUR TIMES DAILY BEFORE MEALS AND AT NIGHT 360 tablet 0     No current facility-administered medications for this visit.       Review of Systems:  General:  No fever, chills, fatigability, or weight loss.  Skin: No rashes, itching, or changes in color or texture of skin.  Chest: Denies MEDINA, cyanosis, wheezing, cough, and sputum production.  Abdomen: Appetite fine. No weight loss. Denies diarrhea, abdominal pain, hematemesis, or blood in stool.  Musculoskeletal: No joint stiffness or swelling. Denies back pain.  : As above.  All other review of systems negative.    PMH:  Past Medical History:   Diagnosis Date    Anxiety     Arthritis     left knee and right shoulder    Cholelithiasis     Depression     GERD (gastroesophageal reflux disease)     Liver lesion     Liver lesion     Other pulmonary embolism without acute cor pulmonale     Popliteal cyst     Pulmonary embolism     Vitamin D deficiency        PSH:  Past Surgical History:   Procedure Laterality Date    CHOLECYSTECTOMY      COLONOSCOPY      COLONOSCOPY N/A 08/08/2017    Procedure: COLONOSCOPY;  Surgeon: Balbina Bailey MD;  Location: [facility];  Service: Endoscopy;  Laterality: N/A;    HERNIA REPAIR      umbilical at age 8    HYSTERECTOMY  01/01/1997    KAILASH for fibroids    LIVER BIOPSY      OOPHORECTOMY  01/01/2010    b/l cysts    PULMONARY EMBOLISM SURGERY      UPPER GASTROINTESTINAL ENDOSCOPY         FamHx:  Family History   Problem Relation Age of Onset    No Known Problems Paternal Grandfather     No Known Problems Paternal Grandmother     No Known Problems Maternal Grandmother     No Known Problems Maternal Grandfather     Cancer Father         lung     Stroke Mother     No Known Problems Brother     Hypertension Sister     Arthritis Sister     Lupus Sister     Ovarian cancer Daughter     No Known Problems Maternal Aunt     No Known Problems Maternal Uncle     No Known Problems Paternal Aunt     No Known Problems Paternal Uncle     Celiac disease Neg Hx     Cirrhosis Neg Hx     Colon cancer Neg Hx     Colon polyps Neg Hx     Crohn's disease Neg Hx     Cystic fibrosis Neg Hx     Esophageal cancer Neg  Hx     Hemochromatosis Neg Hx     Inflammatory bowel disease Neg Hx     Irritable bowel syndrome Neg Hx     Liver cancer Neg Hx     Liver disease Neg Hx     Rectal cancer Neg Hx     Stomach cancer Neg Hx     Ulcerative colitis Neg Hx     Bradley's disease Neg Hx     Breast cancer Neg Hx     Uterine cancer Neg Hx        SocHx:  Social History     Socioeconomic History    Marital status: Single    Number of children: 1    Years of education: college   Tobacco Use    Smoking status: Former     Current packs/day: 0.00     Average packs/day: 0.1 packs/day for 1 year (0.1 ttl pk-yrs)     Types: Cigarettes     Start date: 1993     Quit date: 1994     Years since quittin.2    Smokeless tobacco: Never    Tobacco comments:     pk would last about 1 week   Substance and Sexual Activity    Alcohol use: Never    Drug use: Never    Sexual activity: Not Currently     Partners: Male     Birth control/protection: Surgical     Comment: with one partner for 31 years     Social Determinants of Health     Stress: No Stress Concern Present (2020)    [facility] [place] of Occupational Health - Occupational Stress Questionnaire     Feeling of Stress : Only a little       Physical Exam:  Vitals:    24 0949   BP: 117/74   Pulse: 67   Resp: 19   Temp: 97.5 °F (36.4 °C)     General: A&Ox3, no apparent distress, no deformities  Neck: No masses, normal thyroid  Lungs: CTA arlen, no use of accessory muscles  Heart: RRR, no arrhythmias  Abdomen: Soft, NT, ND, no masses, no hernias, no hepatosplenomegaly  Lymphatic: Neck and groin nodes negative  Skin: The skin is warm and dry. No jaundice.  Ext: No c/c/e.    Urinalysis:  Results for orders placed or performed in visit on 24   POCT URINE DIPSTICK WITH MICROSCOPE, AUTOMATED   Result Value Ref Range    Color, UA Yellow     Spec Grav UA 1.010     pH, UA 6.0     WBC, UA neg     Nitrite, UA neg     Protein, POC neg     Glucose, UA neg     Ketones, UA neg     Urobilinogen, UA  0.2     Bilirubin, POC neg     Blood, UA trace-intact    Microscopic urinalysis revealed trace RBCs, negative WBCs and nitrites.            Impression:  1. Recurrent cystitis  - POCT URINE DIPSTICK WITH MICROSCOPE, AUTOMATED  2. Microscopic hematuria    Plan:  Instructed patient we will send urine for cytology and notify patient results when completed, RTC 6 months with urine cytology, instructed patient develops any abnormal urologic symptoms notify clinic to be re-evaluate treated if patient has recurring confirmed UTI with culture and sensitivity more than 4-5 year we will consider suppression therapy.

## 2024-02-23 LAB
ESTROGEN SERPL-MCNC: NORMAL PG/ML
INSULIN SERPL-ACNC: NORMAL U[IU]/ML
LAB AP CLINICAL INFORMATION: NORMAL
LAB AP GROSS DESCRIPTION: NORMAL
LAB AP URINE CYTOLOGY INTERPRETATION SPECIMEN 1: NORMAL

## 2024-02-27 ENCOUNTER — OFFICE VISIT (OUTPATIENT)
Dept: UROLOGY | Facility: CLINIC | Age: 59
End: 2024-02-27
Payer: MEDICARE

## 2024-02-27 DIAGNOSIS — R31.29 OTHER MICROSCOPIC HEMATURIA: Primary | ICD-10-CM

## 2024-02-27 PROCEDURE — 1160F RVW MEDS BY RX/DR IN RCRD: CPT | Mod: CPTII,95,, | Performed by: NURSE PRACTITIONER

## 2024-02-27 PROCEDURE — 99499 UNLISTED E&M SERVICE: CPT | Mod: 95,,, | Performed by: NURSE PRACTITIONER

## 2024-02-27 PROCEDURE — 1159F MED LIST DOCD IN RCRD: CPT | Mod: CPTII,95,, | Performed by: NURSE PRACTITIONER

## 2024-02-27 NOTE — PROGRESS NOTES
Established Patient - Audio Only Telehealth Visit     The patient location is:  Home  The chief complaint leading to consultation is:  Urine cytology results  Visit type: Virtual visit with audio only (telephone)  Total time spent with patient:  20  minutes     The reason for the audio only service rather than synchronous audio virtual visit was related to technical difficulties or patient preference/necessity.     Each patient to whom I provide medical services by telemedicine is:  (1) informed of the relationship between the physician and patient and the respective role of any other health care provider with respect to management of the patient; and (2) notified that they may decline to receive medical services by telemedicine and may withdraw from such care at any time. Patient verbally consented to receive this service via voice-only telephone call.     This service was not originating from a related E/M service provided within the previous 7 days nor will  to an E/M service or procedure within the next 24 hours or my soonest available appointment.  Prevailing standard of care was able to be met in this audio-only visit.      Chief Complaint:  Urine cytology results    HPI:  Patient is a 59-year-old female on this audio virtual visit to discuss urinary cytology results.  Spoke to patient and resulted urine cytology results as listed below negative for carcinoma.  Instructed patient if develops any abnormal urologic symptoms notify clinic to be re-evaluated treated and also to keep scheduled appointment in 6 months as scheduled for urine cytology.      Allergies:  Review of patient's allergies indicates:   Allergen Reactions    Bactrim [sulfamethoxazole-trimethoprim] Hives    Penicillins Hives       Medications:  Current Outpatient Medications   Medication Sig Dispense Refill    albuterol (PROVENTIL) 2.5 mg /3 mL (0.083 %) nebulizer solution Take 2.5 mg by nebulization every 4 (four) hours as needed.       albuterol (PROVENTIL) 5 mg/mL nebulizer solution Inhale 2.5 mg into the lungs every 4 (four) hours as needed.      albuterol (PROVENTIL) 5 mg/mL nebulizer solution SMARTSI.5 Milliliter(s) Via Nebulizer Every 4 Hours PRN      albuterol (PROVENTIL/VENTOLIN HFA) 90 mcg/actuation inhaler Inhale 2 puffs into the lungs every 4 (four) hours as needed.      albuterol (PROVENTIL/VENTOLIN HFA) 90 mcg/actuation inhaler Inhale 2 puffs into the lungs every 4 (four) hours as needed.      ascorbic acid, vitamin C, (VITAMIN C) 1000 MG tablet Take 1,000 mg by mouth once daily.      buPROPion (WELLBUTRIN XL) 150 MG TB24 tablet Take 1 tablet (150 mg total) by mouth once daily. 30 tablet 5    cyanocobalamin, vitamin B-12, 1,000 mcg TbSR Take 1 tablet by mouth once daily.      cyclobenzaprine (FLEXERIL) 10 MG tablet Take 10 mg by mouth 3 (three) times daily as needed for Muscle spasms.      dexAMETHasone (DECADRON) 4 MG Tab Take 4 mg by mouth every morning.      diazePAM (VALIUM) 10 MG Tab 1 PO 1 hour prior to MRI      diclofenac sodium (VOLTAREN) 1 % Gel Apply 2 g topically 4 (four) times daily as needed. 100 g 0    famotidine (PEPCID) 20 MG tablet Take 1 tablet (20 mg total) by mouth 2 (two) times daily as needed for Heartburn. (Patient not taking: Reported on 2021) 60 tablet 11    fluticasone propionate (FLONASE) 50 mcg/actuation nasal spray by Each Nostril route.      ibuprofen (ADVIL,MOTRIN) 800 MG tablet Take 1 tablet (800 mg total) by mouth 3 (three) times daily as needed for Pain. 90 tablet 0    Lactobacillus rhamnosus GG (CULTURELLE) 10 billion cell capsule Take 1 capsule by mouth once daily.      loratadine (CLARITIN) 10 mg tablet Take 1 tablet (10 mg total) by mouth once daily. (Patient not taking: Reported on 3/24/2023)      pantoprazole (PROTONIX) 40 MG tablet TAKE 1 TABLET(40 MG) BY MOUTH TWICE DAILY FOR 3 DAYS 60 tablet 3    promethazine (PHENERGAN) 25 MG tablet Take 0.5-1 tablets (12.5-25 mg total) by mouth every  6 (six) hours as needed for Nausea. (Patient not taking: Reported on 7/14/2023) 20 tablet 0    sucralfate (CARAFATE) 1 gram tablet TAKE 1 TABLET(1 GRAM) BY MOUTH FOUR TIMES DAILY BEFORE MEALS AND AT NIGHT 360 tablet 0     No current facility-administered medications for this visit.       Review of Systems:  General: No fever, chills, fatigability, or weight loss.  Skin: No rashes, itching, or changes in color or texture of skin.  Chest: Denies MEDINA, cyanosis, wheezing, cough, and sputum production.  Abdomen: Appetite fine. No weight loss. Denies diarrhea, abdominal pain, hematemesis, or blood in stool.  Musculoskeletal: No joint stiffness or swelling. Denies back pain.  : As above.  All other review of systems negative.    PMH:  Past Medical History:   Diagnosis Date    Anxiety     Arthritis     left knee and right shoulder    Cholelithiasis     Depression     GERD (gastroesophageal reflux disease)     Liver lesion     Liver lesion     Other pulmonary embolism without acute cor pulmonale     Popliteal cyst     Pulmonary embolism     Vitamin D deficiency        PSH:  Past Surgical History:   Procedure Laterality Date    CHOLECYSTECTOMY      COLONOSCOPY      COLONOSCOPY N/A 08/08/2017    Procedure: COLONOSCOPY;  Surgeon: Balbina Bailey MD;  Location: [facility];  Service: Endoscopy;  Laterality: N/A;    HERNIA REPAIR      umbilical at age 8    HYSTERECTOMY  01/01/1997    KAILASH for fibroids    LIVER BIOPSY      OOPHORECTOMY  01/01/2010    b/l cysts    PULMONARY EMBOLISM SURGERY      UPPER GASTROINTESTINAL ENDOSCOPY         FamHx:  Family History   Problem Relation Age of Onset    No Known Problems Paternal Grandfather     No Known Problems Paternal Grandmother     No Known Problems Maternal Grandmother     No Known Problems Maternal Grandfather     Cancer Father         lung     Stroke Mother     No Known Problems Brother     Hypertension Sister     Arthritis Sister     Lupus Sister     Ovarian cancer Daughter      No Known Problems Maternal Aunt     No Known Problems Maternal Uncle     No Known Problems Paternal Aunt     No Known Problems Paternal Uncle     Celiac disease Neg Hx     Cirrhosis Neg Hx     Colon cancer Neg Hx     Colon polyps Neg Hx     Crohn's disease Neg Hx     Cystic fibrosis Neg Hx     Esophageal cancer Neg Hx     Hemochromatosis Neg Hx     Inflammatory bowel disease Neg Hx     Irritable bowel syndrome Neg Hx     Liver cancer Neg Hx     Liver disease Neg Hx     Rectal cancer Neg Hx     Stomach cancer Neg Hx     Ulcerative colitis Neg Hx     Bradley's disease Neg Hx     Breast cancer Neg Hx     Uterine cancer Neg Hx        SocHx:  Social History     Socioeconomic History    Marital status: Single    Number of children: 1    Years of education: college   Tobacco Use    Smoking status: Former     Current packs/day: 0.00     Average packs/day: 0.1 packs/day for 1 year (0.1 ttl pk-yrs)     Types: Cigarettes     Start date: 1993     Quit date: 1994     Years since quittin.2    Smokeless tobacco: Never    Tobacco comments:     pk would last about 1 week   Substance and Sexual Activity    Alcohol use: Never    Drug use: Never    Sexual activity: Not Currently     Partners: Male     Birth control/protection: Surgical     Comment: with one partner for 31 years     Social Determinants of Health     Stress: No Stress Concern Present (2020)    [employer] [place] of Occupational Health - Occupational Stress Questionnaire     Feeling of Stress : Only a little       Physical Exam:  There were no vitals filed for this visit.      Labs: Final Diagnosis     Urine, bladder:  - Negative for malignancy.       Electronically signed by Ally Body MD on 2024 at 1552  Clinical Information  Hematuria  Gross Description  1. Urine, Bladder, :   Received fresh is 60 ml of clear yellow fluid submitted for cytospin preparation.   Specimen 1 InterpretationNegative for high grade urothelial  carcinoma  Electronically signed by Ally Boyd MD on 2/23/2024 at 1552  [facility]         Impression:  Microscopic hematuria  There are no diagnoses linked to this encounter.    Plan:  Instructed patient to keep scheduled appointment in 6 months with urine cytology.  Instructed patient develops any abnormal urologic symptoms notify clinic to be re-evaluated and treated.

## 2024-03-28 ENCOUNTER — OFFICE VISIT (OUTPATIENT)
Dept: GYNECOLOGY | Facility: CLINIC | Age: 59
End: 2024-03-28
Payer: MEDICARE

## 2024-03-28 VITALS
RESPIRATION RATE: 20 BRPM | BODY MASS INDEX: 29.47 KG/M2 | SYSTOLIC BLOOD PRESSURE: 100 MMHG | OXYGEN SATURATION: 100 % | HEIGHT: 68 IN | HEART RATE: 90 BPM | TEMPERATURE: 99 F | DIASTOLIC BLOOD PRESSURE: 62 MMHG | WEIGHT: 194.44 LBS

## 2024-03-28 DIAGNOSIS — Z01.419 ENCOUNTER FOR ANNUAL ROUTINE GYNECOLOGICAL EXAMINATION: Primary | ICD-10-CM

## 2024-03-28 PROCEDURE — 99214 OFFICE O/P EST MOD 30 MIN: CPT | Mod: PBBFAC,25 | Performed by: NURSE PRACTITIONER

## 2024-03-28 PROCEDURE — 3078F DIAST BP <80 MM HG: CPT | Mod: CPTII,,, | Performed by: NURSE PRACTITIONER

## 2024-03-28 PROCEDURE — G0101 CA SCREEN;PELVIC/BREAST EXAM: HCPCS | Mod: S$PBB,,, | Performed by: NURSE PRACTITIONER

## 2024-03-28 PROCEDURE — 3074F SYST BP LT 130 MM HG: CPT | Mod: CPTII,,, | Performed by: NURSE PRACTITIONER

## 2024-03-28 PROCEDURE — G0101 CA SCREEN;PELVIC/BREAST EXAM: HCPCS | Mod: PBBFAC | Performed by: NURSE PRACTITIONER

## 2024-03-28 PROCEDURE — 1159F MED LIST DOCD IN RCRD: CPT | Mod: CPTII,,, | Performed by: NURSE PRACTITIONER

## 2024-03-28 RX ORDER — HYDROXYZINE PAMOATE 25 MG/1
25 CAPSULE ORAL
COMMUNITY
Start: 2024-01-31 | End: 2024-05-22

## 2024-03-28 NOTE — PROGRESS NOTES
[facility] -  Gynecology / Women's Health Clinic    Subjective:       Patient ID: Lilian Gordon is a 59 y.o. female.    Chief Complaint:  Gynecologic Exam    History of Present Illness  The patient  here for annual exam. Pt had total hysterectomy for uterine fibroids. Denies history of abnormal paps. MG- 23 & BIRADS 1. Denies breast complaints. Denies pelvic pain, abnormal bleeding or discharge. Pt reports no STIs in the past and no concerns. Pt followed by urology for frequent UTIs. Hx of PE in  following cholecystectomy, used coumadin for 8 months. Pt c/o daily night sweats around 2-3 AM and hot flashes also occur some days. She used HRT in the past prior to PE. Seen by GYN in , started on Buproprion, no longer taking. Denies tobacco use. Dep. screening 0. Denies fly hx of breast, uterine or colon cancer. Daughter had ovarian cancer in 20s. Colonoscopy in 2017. PCP is Dr. Sandhu.     GYN & OB History  No LMP recorded (lmp unknown). Patient has had a hysterectomy.       Review of patient's allergies indicates:   Allergen Reactions    Bactrim [sulfamethoxazole-trimethoprim] Hives    Penicillins Hives     Past Medical History:   Diagnosis Date    Anxiety     Arthritis     left knee and right shoulder    Cholelithiasis     Depression     GERD (gastroesophageal reflux disease)     Liver lesion     Liver lesion     Other pulmonary embolism without acute cor pulmonale     Popliteal cyst     Pulmonary embolism     Vitamin D deficiency      OB History    Para Term  AB Living   4 3 2 1 1 1   SAB IAB Ectopic Multiple Live Births   1       3      # Outcome Date GA Lbr Godwin/2nd Weight Sex Delivery Anes PTL Lv   4       Vag-Spont      3 SAB            2 Term      Vag-Spont      1 Term      Vag-Spont           Review of Systems  Review of Systems    Negative except for pertinent findings for positives per HPI     Objective:    Physical Exam    /62 (BP  "Location: Left arm, Patient Position: Sitting, BP Method: Medium (Automatic))   Pulse 90   Temp 98.8 °F (37.1 °C) (Oral)   Resp 20   Ht 5' 8" (1.727 m)   Wt 88.2 kg (194 lb 7.1 oz)   LMP  (LMP Unknown)   SpO2 100%   BMI 29.57 kg/m²   GENERAL: Well-developed female in no acute distress.  SKIN: Normal to inspection,warm, dry and intact.  BREASTS: No rashes or erythema. No masses, lumps, discharge, tenderness.  VULVA: General appearance WNL; external genitalia with no lesions or erythema.  BIMANUAL EXAM: Vaginal mucosa/vault atrophic Vaginal cuff intact. Uterus/Cervix surgically absent. Ovaries surgically absent. Umang adnexa reveal no tenderness.  PSYCHIATRIC: Patient is oriented to person, place, and time. Mood and affect are normal.    Assessment:         ICD-10-CM ICD-9-CM   1. Encounter for annual routine gynecological examination  Z01.419 V72.31     Plan:   Lilian was seen today for gynecologic exam.    Diagnoses and all orders for this visit:    Encounter for annual routine gynecological examination    Pelvic today, pap deferred d/t hysterectomy  Schedule with GYN for hot flashes  Keep MG appt.  Follow up in about 1 year (around 3/28/2025) for annual exam.    "

## 2024-04-08 ENCOUNTER — HOSPITAL ENCOUNTER (OUTPATIENT)
Dept: RADIOLOGY | Facility: HOSPITAL | Age: 59
Discharge: HOME | End: 2024-04-08
Attending: INTERNAL MEDICINE
Payer: MEDICARE

## 2024-04-08 DIAGNOSIS — Z12.31 SCREENING MAMMOGRAM FOR BREAST CANCER: ICD-10-CM

## 2024-04-08 PROCEDURE — 77067 SCR MAMMO BI INCL CAD: CPT | Mod: 26,,, | Performed by: RADIOLOGY

## 2024-04-08 PROCEDURE — 77063 BREAST TOMOSYNTHESIS BI: CPT | Mod: TC

## 2024-04-08 PROCEDURE — 77063 BREAST TOMOSYNTHESIS BI: CPT | Mod: 26,,, | Performed by: RADIOLOGY

## 2024-05-22 ENCOUNTER — OFFICE VISIT (OUTPATIENT)
Dept: GYNECOLOGY | Facility: CLINIC | Age: 59
End: 2024-05-22
Payer: MEDICARE

## 2024-05-22 VITALS
OXYGEN SATURATION: 96 % | WEIGHT: 194.69 LBS | DIASTOLIC BLOOD PRESSURE: 71 MMHG | HEART RATE: 56 BPM | SYSTOLIC BLOOD PRESSURE: 111 MMHG | HEIGHT: 68 IN | BODY MASS INDEX: 29.51 KG/M2 | TEMPERATURE: 98 F

## 2024-05-22 DIAGNOSIS — N95.2 VAGINAL ATROPHY: Primary | ICD-10-CM

## 2024-05-22 DIAGNOSIS — N95.1 INSOMNIA ASSOCIATED WITH MENOPAUSE: ICD-10-CM

## 2024-05-22 LAB
BILIRUB SERPL-MCNC: NEGATIVE MG/DL
BLOOD URINE, POC: NORMAL
CLARITY, POC UA: NORMAL
COLOR, POC UA: YELLOW
GLUCOSE UR QL STRIP: NEGATIVE
KETONES UR QL STRIP: NEGATIVE
LEUKOCYTE ESTERASE URINE, POC: NEGATIVE
NITRITE, POC UA: NEGATIVE
PH, POC UA: 6
PROTEIN, POC: NORMAL
SPECIFIC GRAVITY, POC UA: >=1.03
UROBILINOGEN, POC UA: NORMAL

## 2024-05-22 PROCEDURE — 81002 URINALYSIS NONAUTO W/O SCOPE: CPT | Mod: PBBFAC

## 2024-05-22 PROCEDURE — 99214 OFFICE O/P EST MOD 30 MIN: CPT | Mod: PBBFAC

## 2024-05-22 RX ORDER — ESTRADIOL 10 UG/1
10 INSERT VAGINAL DAILY
Qty: 18 TABLET | Refills: 11 | Status: SHIPPED | OUTPATIENT
Start: 2024-05-22 | End: 2025-05-22

## 2024-05-22 RX ORDER — ERGOCALCIFEROL 1.25 MG/1
50000 CAPSULE ORAL
COMMUNITY

## 2024-05-22 RX ORDER — HYDROXYZINE PAMOATE 25 MG/1
25 CAPSULE ORAL NIGHTLY
Qty: 60 CAPSULE | Refills: 11 | Status: SHIPPED | OUTPATIENT
Start: 2024-05-22

## 2024-05-22 NOTE — ASSESSMENT & PLAN NOTE
Rx for vagifem to be used 3 times per week.  Recommended she stops bathing and vaginal cleaning.  Proper hygiene discussed.

## 2024-05-22 NOTE — PROGRESS NOTES
[facility] GYNECOLOGY CLINIC NOTE     Lilian Gordon is a 59 y.o.  presenting to GYN clinic for discussion of HRT.  Referred by Josey.  Previously on HRT but had PE after lap lauro. Her main complaints are insomnia with trouble staying asleep. +night sweats.    +vaginal dryness - daily hot baths, washes inside vagina, epsom salt baths  Denie bladder complaints.  Occas diarrhea with abd cramps, better with defecation.    Gynecology  OB History          4    Para   3    Term   2       1    AB   1    Living   1         SAB   1    IAB        Ectopic        Multiple        Live Births   3                Past Medical History:   Diagnosis Date    Anxiety     Arthritis     left knee and right shoulder    Cholelithiasis     Depression     GERD (gastroesophageal reflux disease)     Liver lesion     Liver lesion     Other pulmonary embolism without acute cor pulmonale     Popliteal cyst     Pulmonary embolism     Vitamin D deficiency       Past Surgical History:   Procedure Laterality Date    CHOLECYSTECTOMY      COLONOSCOPY      COLONOSCOPY N/A 2017    Procedure: COLONOSCOPY;  Surgeon: Balbina Bailey MD;  Location: [facility];  Service: Endoscopy;  Laterality: N/A;    HERNIA REPAIR      umbilical at age 8    HYSTERECTOMY  1997    KAILASH for fibroids    LIVER BIOPSY      OOPHORECTOMY  2010    b/l cysts    PULMONARY EMBOLISM SURGERY      UPPER GASTROINTESTINAL ENDOSCOPY          Social History     Tobacco Use    Smoking status: Former     Current packs/day: 0.00     Average packs/day: 0.1 packs/day for 1 year (0.1 ttl pk-yrs)     Types: Cigarettes     Start date: 1993     Quit date: 1994     Years since quittin.4    Smokeless tobacco: Never    Tobacco comments:     pk would last about 1 week   Substance Use Topics    Alcohol use: Never    Drug use: Never       Review of Systems  Pertinent items are noted in HPI.     Objective:     /71 (BP Location: Left arm)   Pulse  "(!) 56 Comment: 61 but went down to 56, pt is asymptomatic  Temp 97.7 °F (36.5 °C)   Ht 5' 8" (1.727 m)   Wt 88.3 kg (194 lb 10.7 oz)   LMP  (LMP Unknown)   SpO2 96%   BMI 29.60 kg/m²   Physical Exam:  Gen: Well-nourished, well-developed female appearing stated age. Alert, cooperative, in no acute distress.        Assessment:       59 y.o.  here for:  1. Vaginal atrophy  estradioL (VAGIFEM) 10 mcg Tab    POCT URINE DIPSTICK WITHOUT MICROSCOPE      2. Insomnia associated with menopause  hydrOXYzine pamoate (VISTARIL) 25 MG Cap             Plan:         Problem List Items Addressed This Visit          Renal/    Vaginal atrophy - Primary     Rx for vagifem to be used 3 times per week.  Recommended she stops bathing and vaginal cleaning.  Proper hygiene discussed.         Relevant Medications    estradioL (VAGIFEM) 10 mcg Tab    Other Relevant Orders    POCT URINE DIPSTICK WITHOUT MICROSCOPE (Completed)    Insomnia associated with menopause     Will start Vistaril 25mg - 50mg nightly for sleep.  We discussed good sleep hygiene with restriction of liquids many hours before bedtime.         Relevant Medications    hydrOXYzine pamoate (VISTARIL) 25 MG Cap       Return to clinic prn.  Keep appts with Josey for wellness.  Return to us if symptoms not improve over next few months.      "

## 2024-05-22 NOTE — ASSESSMENT & PLAN NOTE
Will start Vistaril 25mg - 50mg nightly for sleep.  We discussed good sleep hygiene with restriction of liquids many hours before bedtime.

## 2025-04-01 ENCOUNTER — OFFICE VISIT (OUTPATIENT)
Dept: GYNECOLOGY | Facility: CLINIC | Age: 60
End: 2025-04-01
Payer: MEDICARE

## 2025-04-01 VITALS
TEMPERATURE: 99 F | RESPIRATION RATE: 20 BRPM | DIASTOLIC BLOOD PRESSURE: 67 MMHG | BODY MASS INDEX: 27.56 KG/M2 | HEIGHT: 68 IN | HEART RATE: 72 BPM | SYSTOLIC BLOOD PRESSURE: 125 MMHG | OXYGEN SATURATION: 100 % | WEIGHT: 181.81 LBS

## 2025-04-01 DIAGNOSIS — Z01.419 ENCOUNTER FOR ANNUAL ROUTINE GYNECOLOGICAL EXAMINATION: Primary | ICD-10-CM

## 2025-04-01 DIAGNOSIS — Z12.31 VISIT FOR SCREENING MAMMOGRAM: ICD-10-CM

## 2025-04-01 PROCEDURE — 99214 OFFICE O/P EST MOD 30 MIN: CPT | Mod: PBBFAC | Performed by: NURSE PRACTITIONER

## 2025-04-01 PROCEDURE — G0101 CA SCREEN;PELVIC/BREAST EXAM: HCPCS | Mod: PBBFAC | Performed by: NURSE PRACTITIONER

## 2025-04-01 NOTE — PROGRESS NOTES
[facility] -  Gynecology / Women's Health Clinic    Subjective:       Patient ID: Lilian Gordon is a 60 y.o. female.    Chief Complaint:  Gynecologic Exam    History of Present Illness  The patient  here for annual exam. Pt had total hysterectomy for uterine fibroids. Denies history of abnormal paps. MG- 24 & BIRADS 1. Denies breast complaints. Denies pelvic pain, abnormal bleeding or discharge. Pt reports no STIs in the past and no concerns. Pt followed by urology for frequent UTIs, last seen in . Recent completion of antibiotics for UTI, symptoms have resolved. Hx of PE in  following cholecystectomy, used coumadin for 8 months. Pt was seen by GYN in  and given Vagifem for vaginal dryness and Vistaril for insomnia, not currently taking. She used HRT in the past prior to PE. Pt c/o daily hot flashes and low libido, she expressed concern as to why she can't be treated with hormones as the PE was since . Denies tobacco use. Dep. screening 0. Denies fly hx of breast, uterine or colon cancer. Daughter had ovarian cancer in 20s. Colonoscopy in 2017. PCP is Dr. Sandhu.     GYN & OB History  No LMP recorded (lmp unknown). Patient has had a hysterectomy.     Review of patient's allergies indicates:   Allergen Reactions    Bactrim [sulfamethoxazole-trimethoprim] Hives    Penicillins Hives     Past Medical History:   Diagnosis Date    Anxiety     Arthritis     left knee and right shoulder    Cholelithiasis     Depression     GERD (gastroesophageal reflux disease)     Liver lesion     Liver lesion     Other pulmonary embolism without acute cor pulmonale     Popliteal cyst     Pulmonary embolism     Vitamin D deficiency      OB History    Para Term  AB Living   4 3 2 1 1 1   SAB IAB Ectopic Multiple Live Births   1    3      # Outcome Date GA Lbr Godwin/2nd Weight Sex Type Anes PTL Lv   4       Vag-Spont      3 SAB            2 Term      Vag-Spont      1 Term  "     Vag-Spont           Review of Systems  Review of Systems    Negative except for pertinent findings for positives per HPI     Objective:    Physical Exam    /67 (BP Location: Right arm, Patient Position: Sitting)   Pulse 72   Temp 98.6 °F (37 °C) (Oral)   Resp 20   Ht 5' 8" (1.727 m)   Wt 82.5 kg (181 lb 12.8 oz)   LMP  (LMP Unknown)   SpO2 100%   BMI 27.64 kg/m²   GENERAL: Well-developed female in no acute distress.  SKIN: Normal to inspection,warm, dry and intact.  BREASTS: No rashes or erythema. No masses, lumps, discharge, tenderness.  VULVA: General appearance WNL; external genitalia with no lesions or erythema.  BIMANUAL EXAM: Vaginal mucosa/vault pale, Vaginal cuff intact. Uterus/Cervix surgically absent. Ovaries surgically absent. Umang adnexa reveal no tenderness.  PSYCHIATRIC: Patient is oriented to person, place, and time. Mood and affect are normal.    Assessment:         ICD-10-CM ICD-9-CM   1. Encounter for annual routine gynecological examination  Z01.419 V72.31   2. Visit for screening mammogram  Z12.31 V76.12     Plan:   Lilian Dorsey" was seen today for gynecologic exam.    Diagnoses and all orders for this visit:    Encounter for annual routine gynecological examination    Visit for screening mammogram  -     Mammo Digital Screening Bilat w/ Gigi (XPD); Future    Pelvic today, pap deferred d/t hysterectomy  MG ordered  Will schedule with GYN for hot flashes and low libido, discussed risk associated with use of HRT and hx of PE  Schedule f/u with Urology  Follow up in about 1 year (around 4/1/2026) for annual exam.    "

## 2025-04-10 ENCOUNTER — HOSPITAL ENCOUNTER (OUTPATIENT)
Dept: RADIOLOGY | Facility: HOSPITAL | Age: 60
Discharge: HOME | End: 2025-04-10
Attending: NURSE PRACTITIONER
Payer: MEDICARE

## 2025-04-10 DIAGNOSIS — Z12.31 VISIT FOR SCREENING MAMMOGRAM: ICD-10-CM

## 2025-04-10 PROCEDURE — 77063 BREAST TOMOSYNTHESIS BI: CPT | Mod: 26,,, | Performed by: RADIOLOGY

## 2025-04-10 PROCEDURE — 77067 SCR MAMMO BI INCL CAD: CPT | Mod: 26,,, | Performed by: RADIOLOGY

## 2025-04-10 PROCEDURE — 77067 SCR MAMMO BI INCL CAD: CPT | Mod: TC

## 2025-04-17 ENCOUNTER — OFFICE VISIT (OUTPATIENT)
Dept: UROLOGY | Facility: CLINIC | Age: 60
End: 2025-04-17
Payer: MEDICARE

## 2025-04-17 VITALS
SYSTOLIC BLOOD PRESSURE: 112 MMHG | DIASTOLIC BLOOD PRESSURE: 71 MMHG | WEIGHT: 180.75 LBS | HEIGHT: 68 IN | TEMPERATURE: 98 F | BODY MASS INDEX: 27.39 KG/M2 | HEART RATE: 71 BPM | OXYGEN SATURATION: 98 % | RESPIRATION RATE: 20 BRPM

## 2025-04-17 DIAGNOSIS — N30.90 RECURRENT CYSTITIS: Primary | ICD-10-CM

## 2025-04-17 DIAGNOSIS — R31.29 OTHER MICROSCOPIC HEMATURIA: ICD-10-CM

## 2025-04-17 LAB
BILIRUB SERPL-MCNC: NEGATIVE MG/DL
BLOOD URINE, POC: NORMAL
COLOR, POC UA: NORMAL
GLUCOSE UR QL STRIP: NEGATIVE
KETONES UR QL STRIP: NEGATIVE
LEUKOCYTE ESTERASE URINE, POC: NEGATIVE
NITRITE, POC UA: NEGATIVE
PH, POC UA: 6.5
PROTEIN, POC: NEGATIVE
SPECIFIC GRAVITY, POC UA: 1.02
UROBILINOGEN, POC UA: 0.2

## 2025-04-17 PROCEDURE — 3078F DIAST BP <80 MM HG: CPT | Mod: CPTII,,, | Performed by: NURSE PRACTITIONER

## 2025-04-17 PROCEDURE — 99213 OFFICE O/P EST LOW 20 MIN: CPT | Mod: S$PBB,,, | Performed by: NURSE PRACTITIONER

## 2025-04-17 PROCEDURE — 1159F MED LIST DOCD IN RCRD: CPT | Mod: CPTII,,, | Performed by: NURSE PRACTITIONER

## 2025-04-17 PROCEDURE — 99214 OFFICE O/P EST MOD 30 MIN: CPT | Mod: PBBFAC | Performed by: NURSE PRACTITIONER

## 2025-04-17 PROCEDURE — 3008F BODY MASS INDEX DOCD: CPT | Mod: CPTII,,, | Performed by: NURSE PRACTITIONER

## 2025-04-17 PROCEDURE — 3074F SYST BP LT 130 MM HG: CPT | Mod: CPTII,,, | Performed by: NURSE PRACTITIONER

## 2025-04-17 PROCEDURE — 1160F RVW MEDS BY RX/DR IN RCRD: CPT | Mod: CPTII,,, | Performed by: NURSE PRACTITIONER

## 2025-04-17 PROCEDURE — 81001 URINALYSIS AUTO W/SCOPE: CPT | Mod: PBBFAC | Performed by: NURSE PRACTITIONER

## 2025-04-17 NOTE — PROGRESS NOTES
Chief Complaint:   Chief Complaint   Patient presents with     Recurrent cystitis & microscopic hematuria with cytology       HPI:   Patient is a 58-year-old female here for follow-up for recurrent urinary tract infections and hematuria.   Patient has a history of recurring UTIs with symptoms of foul-smelling urine and cloudy urine in November of 2022 she was seen by an urgent care and treated.    Patient due to persistent microscopic hematuria.    Patient's history of smoking.  Patient underwent a cystoscope with Dr. Diaz on 08/17/2023  which was negative cystoscope.    Today patient presents without any symptoms of dysuria, urinary frequency, urinary urgency, urinary hesitancy, urinary retention, urinary incontinence, gross hematuria, nocturia.       Allergies:  Review of patient's allergies indicates:   Allergen Reactions    Bactrim [sulfamethoxazole-trimethoprim] Hives    Penicillins Hives       Medications:  Current Medications[1]    Review of Systems:  General: No fever, chills, fatigability, or weight loss.  Skin: No rashes, itching, or changes in color or texture of skin.  Chest: Denies MEDINA, cyanosis, wheezing, cough, and sputum production.  Abdomen: Appetite fine. No weight loss. Denies diarrhea, abdominal pain, hematemesis, or blood in stool.  Musculoskeletal: No joint stiffness or swelling. Denies back pain.  : As above.  All other review of systems negative.    PMH:  Past Medical History:   Diagnosis Date    Anxiety     Arthritis     left knee and right shoulder    Cholelithiasis     Depression     GERD (gastroesophageal reflux disease)     Liver lesion     Liver lesion     Other pulmonary embolism without acute cor pulmonale     Popliteal cyst     Pulmonary embolism     Vitamin D deficiency        PSH:  Past Surgical History:   Procedure Laterality Date    CHOLECYSTECTOMY      COLONOSCOPY      COLONOSCOPY N/A 08/08/2017    Procedure: COLONOSCOPY;  Surgeon: Balbina Bailey MD;  Location: [facility]  ENDO;  Service: Endoscopy;  Laterality: N/A;    HERNIA REPAIR      umbilical at age 8    HYSTERECTOMY  01/01/1997    KAILASH for fibroids    LIVER BIOPSY      OOPHORECTOMY  01/01/2010    b/l cysts    PULMONARY EMBOLISM SURGERY      UPPER GASTROINTESTINAL ENDOSCOPY         FamHx:  Family History   Problem Relation Name Age of Onset    No Known Problems Paternal Grandfather      No Known Problems Paternal Grandmother      No Known Problems Maternal Grandmother      No Known Problems Maternal Grandfather      Cancer Father          lung     Stroke Mother      No Known Problems Brother      Hypertension Sister 2     Arthritis Sister 2     Lupus Sister 1     Ovarian cancer Daughter Paz     No Known Problems Maternal Aunt      No Known Problems Maternal Uncle      No Known Problems Paternal Aunt      No Known Problems Paternal Uncle      Celiac disease Neg Hx      Cirrhosis Neg Hx      Colon cancer Neg Hx      Colon polyps Neg Hx      Crohn's disease Neg Hx      Cystic fibrosis Neg Hx      Esophageal cancer Neg Hx      Hemochromatosis Neg Hx      Inflammatory bowel disease Neg Hx      Irritable bowel syndrome Neg Hx      Liver cancer Neg Hx      Liver disease Neg Hx      Rectal cancer Neg Hx      Stomach cancer Neg Hx      Ulcerative colitis Neg Hx      Bradley's disease Neg Hx      Breast cancer Neg Hx      Uterine cancer Neg Hx         SocHx:  Social History[2]    Physical Exam:  Vitals:    04/17/25 0953   BP: 112/71   Pulse: 71   Resp: 20   Temp: 97.7 °F (36.5 °C)     General: A&Ox3, no apparent distress, no deformities  Neck: No masses, normal thyroid  Lungs: CTA arlen, no use of accessory muscles  Heart: RRR, no arrhythmias  Abdomen: Soft, NT, ND, no masses, no hernias, no hepatosplenomegaly  Lymphatic: Neck and groin nodes negative  Skin: The skin is warm and dry. No jaundice.  Ext: No c/c/e.      Urinalysis:  Results for orders placed or performed in visit on 04/17/25   POCT URINE DIPSTICK WITH MICROSCOPE, AUTOMATED    Result Value Ref Range    Color, UA Dark Yellow     Spec Grav UA 1.020     pH, UA 6.5     WBC, UA Negative     Nitrite, UA Negative     Protein, POC Negative     Glucose, UA Negative     Ketones, UA Negative     Urobilinogen, UA 0.2     Bilirubin, POC Negative     Blood, UA Trace-intact    Microscopic urinalysis WBCs, negative trace intact 1-2 per HPF, nitrites negative, negative bacteria.      Impression:  1. Recurrent cystitis  - POCT URINE DIPSTICK WITH MICROSCOPE, AUTOMATED    2. Other microscopic hematuria  - POCT URINE DIPSTICK WITH MICROSCOPE, AUTOMATED      Plan:  Instructed patient to schedule patient for 1 week audio virtual visit to discuss urine cytology results.  RTC one year urine cytology.  Instructed patient if develops any abnormal urologic symptoms notify clinic to be re-evaluate treated or during after hours go to emergency room versus urgent here.                           [facility]         [1]   Current Outpatient Medications   Medication Sig Dispense Refill    albuterol (PROVENTIL) 2.5 mg /3 mL (0.083 %) nebulizer solution Take 2.5 mg by nebulization every 4 (four) hours as needed.      albuterol (PROVENTIL) 5 mg/mL nebulizer solution Inhale 2.5 mg into the lungs every 4 (four) hours as needed.      ascorbic acid, vitamin C, (VITAMIN C) 1000 MG tablet Take 1,000 mg by mouth once daily.      ergocalciferol (VITAMIN D2) 50,000 unit Cap Take 50,000 Units by mouth every 7 days.      ibuprofen (ADVIL,MOTRIN) 800 MG tablet Take 1 tablet (800 mg total) by mouth 3 (three) times daily as needed for Pain. 90 tablet 0    pantoprazole (PROTONIX) 40 MG tablet TAKE 1 TABLET(40 MG) BY MOUTH TWICE DAILY FOR 3 DAYS 60 tablet 3    albuterol (PROVENTIL) 5 mg/mL nebulizer solution SMARTSI.5 Milliliter(s) Via Nebulizer Every 4 Hours PRN (Patient not taking: Reported on 2024)      buPROPion (WELLBUTRIN XL) 150 MG TB24 tablet Take 1 tablet (150 mg total) by mouth once daily. 30 tablet 5    cyclobenzaprine  (FLEXERIL) 10 MG tablet Take 10 mg by mouth 3 (three) times daily as needed for Muscle spasms. (Patient not taking: Reported on 2024)      diazePAM (VALIUM) 10 MG Tab 1 PO 1 hour prior to MRI (Patient not taking: Reported on 2025)      diclofenac sodium (VOLTAREN) 1 % Gel Apply 2 g topically 4 (four) times daily as needed. (Patient not taking: Reported on 3/28/2024) 100 g 0    estradioL (VAGIFEM) 10 mcg Tab Place 1 tablet (10 mcg total) vaginally once daily. (Patient not taking: Reported on 2025) 18 tablet 11    famotidine (PEPCID) 20 MG tablet Take 1 tablet (20 mg total) by mouth 2 (two) times daily as needed for Heartburn. (Patient not taking: Reported on 2021) 60 tablet 11    fluticasone propionate (FLONASE) 50 mcg/actuation nasal spray by Each Nostril route. (Patient not taking: Reported on 2024)      promethazine (PHENERGAN) 25 MG tablet Take 0.5-1 tablets (12.5-25 mg total) by mouth every 6 (six) hours as needed for Nausea. (Patient not taking: Reported on 2025) 20 tablet 0     No current facility-administered medications for this visit.   [2]   Social History  Socioeconomic History    Marital status: Single    Number of children: 1    Years of education: college   Tobacco Use    Smoking status: Former     Current packs/day: 0.00     Average packs/day: 0.1 packs/day for 1 year (0.1 ttl pk-yrs)     Types: Cigarettes     Start date: 1993     Quit date: 1994     Years since quittin.3    Smokeless tobacco: Never    Tobacco comments:     pk would last about 1 week   Substance and Sexual Activity    Alcohol use: Never    Drug use: Never    Sexual activity: Not Currently     Partners: Male     Birth control/protection: Surgical     Comment: with one partner for 31 years     Social Drivers of Health     Stress: No Stress Concern Present (2020)    [employer] - Occupational Stress Questionnaire     Feeling of Stress : Only a little

## 2025-04-25 ENCOUNTER — OFFICE VISIT (OUTPATIENT)
Dept: UROLOGY | Facility: CLINIC | Age: 60
End: 2025-04-25
Payer: MEDICARE

## 2025-04-25 DIAGNOSIS — R31.21 ASYMPTOMATIC MICROSCOPIC HEMATURIA: Primary | ICD-10-CM

## 2025-04-25 NOTE — PROGRESS NOTES
Established Patient - Audio Only Telehealth Visit     The patient location is:  Home  The chief complaint leading to consultation is:  Microscopic hematuria  Visit type: Virtual visit with audio only (telephone)  Total time spent with patient:  20  minutes  Total time spent Medical decision-making with patient: 15 minutes    The reason for the audio only service rather than synchronous audio virtual visit was related to technical difficulties or patient preference/necessity.     Each patient to whom I provide medical services by telemedicine is:  (1) informed of the relationship between the physician and patient and the respective role of any other health care provider with respect to management of the patient; and (2) notified that they may decline to receive medical services by telemedicine and may withdraw from such care at any time. Patient verbally consented to receive this service via voice-only telephone call.     This service was not originating from a related E/M service provided within the previous 7 days nor will  to an E/M service or procedure within the next 24 hours or my soonest available appointment.  Prevailing standard of care was able to be met in this audio-only visit.  Chief Complaint:  Microscopic hematuria      HPI:   Patient is a 58-year-old female here for audio virtual follow-up microscopic hematuria and urine cytology results.   Patient has a history of recurring UTIs with symptoms of foul-smelling urine and cloudy urine in November of 2022 she was seen by an urgent care and treated.    Patient due to persistent microscopic hematuria.    Patient's history of smoking.  Patient underwent a cystoscope with Dr. Diaz on 08/17/2023  which was negative cystoscope.    Today discuss urine cytology results negative for malignancy or urethral carcinoma.      Allergies:  Review of patient's allergies indicates:   Allergen Reactions    Bactrim [sulfamethoxazole-trimethoprim] Hives     Penicillins Hives       Medications:  Current Medications[1]    Review of Systems:  General: No fever, chills, fatigability, or weight loss.  Skin: No rashes, itching, or changes in color or texture of skin.  Chest: Denies MEDINA, cyanosis, wheezing, cough, and sputum production.  Abdomen: Appetite fine. No weight loss. Denies diarrhea, abdominal pain, hematemesis, or blood in stool.  Musculoskeletal: No joint stiffness or swelling. Denies back pain.  : As above.  All other review of systems negative.    PMH:  Past Medical History:   Diagnosis Date    Anxiety     Arthritis     left knee and right shoulder    Cholelithiasis     Depression     GERD (gastroesophageal reflux disease)     Liver lesion     Liver lesion     Other pulmonary embolism without acute cor pulmonale     Popliteal cyst     Pulmonary embolism     Vitamin D deficiency        PSH:  Past Surgical History:   Procedure Laterality Date    CHOLECYSTECTOMY      COLONOSCOPY      COLONOSCOPY N/A 08/08/2017    Procedure: COLONOSCOPY;  Surgeon: Balbina Bailey MD;  Location: [facility];  Service: Endoscopy;  Laterality: N/A;    HERNIA REPAIR      umbilical at age 8    HYSTERECTOMY  01/01/1997    KAILASH for fibroids    LIVER BIOPSY      OOPHORECTOMY  01/01/2010    b/l cysts    PULMONARY EMBOLISM SURGERY      UPPER GASTROINTESTINAL ENDOSCOPY         FamHx:  Family History   Problem Relation Name Age of Onset    No Known Problems Paternal Grandfather      No Known Problems Paternal Grandmother      No Known Problems Maternal Grandmother      No Known Problems Maternal Grandfather      Cancer Father          lung     Stroke Mother      No Known Problems Brother      Hypertension Sister 2     Arthritis Sister 2     Lupus Sister 1     Ovarian cancer Daughter Tyraneka     No Known Problems Maternal Aunt      No Known Problems Maternal Uncle      No Known Problems Paternal Aunt      No Known Problems Paternal Uncle      Celiac disease Neg Hx      Cirrhosis Neg Hx       Colon cancer Neg Hx      Colon polyps Neg Hx      Crohn's disease Neg Hx      Cystic fibrosis Neg Hx      Esophageal cancer Neg Hx      Hemochromatosis Neg Hx      Inflammatory bowel disease Neg Hx      Irritable bowel syndrome Neg Hx      Liver cancer Neg Hx      Liver disease Neg Hx      Rectal cancer Neg Hx      Stomach cancer Neg Hx      Ulcerative colitis Neg Hx      Bradley's disease Neg Hx      Breast cancer Neg Hx      Uterine cancer Neg Hx         SocHx:  Social History[2]    Physical Exam:  There were no vitals filed for this visit.      Labs:   Final Diagnosis      Urine, clean catch:  - Negative for malignancy.        Electronically signed by Ally Boyd MD on 2025 at 11:09 AM    Gross Description    1. Urine, Clean Catch:   Received fresh is 80 ml of clear yellow fluid submitted for cytospin preparation.    Specimen 1 Interpretation   Negative for high grade urothelial carcinoma   Electronically signed by Ally Boyd MD on 2025 at 11:09 AM       Impression:  Microscopic hematuria    Plan:  Instructed patient to RTC 1 year with urine cytology.  Instructed patient if develops any abnormal urologic symptoms notify clinic to be re-evaluate treated or during after hours go to emergency room versus urgent here.                           [facility]       [1]   Current Outpatient Medications   Medication Sig Dispense Refill    albuterol (PROVENTIL) 2.5 mg /3 mL (0.083 %) nebulizer solution Take 2.5 mg by nebulization every 4 (four) hours as needed.      albuterol (PROVENTIL) 5 mg/mL nebulizer solution Inhale 2.5 mg into the lungs every 4 (four) hours as needed.      albuterol (PROVENTIL) 5 mg/mL nebulizer solution SMARTSI.5 Milliliter(s) Via Nebulizer Every 4 Hours PRN (Patient not taking: Reported on 2024)      ascorbic acid, vitamin C, (VITAMIN C) 1000 MG tablet Take 1,000 mg by mouth once daily.      buPROPion (WELLBUTRIN XL) 150 MG TB24 tablet Take 1 tablet (150 mg total) by mouth  once daily. 30 tablet 5    cyclobenzaprine (FLEXERIL) 10 MG tablet Take 10 mg by mouth 3 (three) times daily as needed for Muscle spasms. (Patient not taking: Reported on 2024)      diazePAM (VALIUM) 10 MG Tab 1 PO 1 hour prior to MRI (Patient not taking: Reported on 2025)      diclofenac sodium (VOLTAREN) 1 % Gel Apply 2 g topically 4 (four) times daily as needed. (Patient not taking: Reported on 3/28/2024) 100 g 0    ergocalciferol (VITAMIN D2) 50,000 unit Cap Take 50,000 Units by mouth every 7 days.      estradioL (VAGIFEM) 10 mcg Tab Place 1 tablet (10 mcg total) vaginally once daily. (Patient not taking: Reported on 2025) 18 tablet 11    famotidine (PEPCID) 20 MG tablet Take 1 tablet (20 mg total) by mouth 2 (two) times daily as needed for Heartburn. (Patient not taking: Reported on 2021) 60 tablet 11    fluticasone propionate (FLONASE) 50 mcg/actuation nasal spray by Each Nostril route. (Patient not taking: Reported on 2024)      ibuprofen (ADVIL,MOTRIN) 800 MG tablet Take 1 tablet (800 mg total) by mouth 3 (three) times daily as needed for Pain. 90 tablet 0    pantoprazole (PROTONIX) 40 MG tablet TAKE 1 TABLET(40 MG) BY MOUTH TWICE DAILY FOR 3 DAYS 60 tablet 3    promethazine (PHENERGAN) 25 MG tablet Take 0.5-1 tablets (12.5-25 mg total) by mouth every 6 (six) hours as needed for Nausea. (Patient not taking: Reported on 2025) 20 tablet 0     No current facility-administered medications for this visit.   [2]   Social History  Socioeconomic History    Marital status: Single    Number of children: 1    Years of education: college   Tobacco Use    Smoking status: Former     Current packs/day: 0.00     Average packs/day: 0.1 packs/day for 1 year (0.1 ttl pk-yrs)     Types: Cigarettes     Start date: 1993     Quit date: 1994     Years since quittin.4    Smokeless tobacco: Never    Tobacco comments:     pk would last about 1 week   Substance and Sexual Activity    Alcohol  use: Never    Drug use: Never    Sexual activity: Not Currently     Partners: Male     Birth control/protection: Surgical     Comment: with one partner for 31 years     Social Drivers of Health     Stress: No Stress Concern Present (7/1/2020)    [employer] - Occupational Stress Questionnaire     Feeling of Stress : Only a little

## 2025-07-31 ENCOUNTER — LAB VISIT (OUTPATIENT)
Dept: LAB | Facility: HOSPITAL | Age: 60
End: 2025-07-31
Attending: STUDENT IN AN ORGANIZED HEALTH CARE EDUCATION/TRAINING PROGRAM
Payer: MEDICARE

## 2025-07-31 DIAGNOSIS — R45.86 MOOD SWINGS: Primary | ICD-10-CM

## 2025-07-31 LAB
CORTIS SERPL-SCNC: 4.8 UG/DL
ERYTHROCYTE [DISTWIDTH] IN BLOOD BY AUTOMATED COUNT: 15.6 % (ref 11.5–17)
HCT VFR BLD AUTO: 36.5 % (ref 37–47)
HGB BLD-MCNC: 12 G/DL (ref 12–16)
MCH RBC QN AUTO: 24.2 PG (ref 27–31)
MCHC RBC AUTO-ENTMCNC: 32.9 G/DL (ref 33–36)
MCV RBC AUTO: 73.6 FL (ref 80–94)
NRBC BLD AUTO-RTO: 0 %
PLATELET # BLD AUTO: 262 X10(3)/MCL (ref 130–400)
PMV BLD AUTO: 9.9 FL (ref 7.4–10.4)
RBC # BLD AUTO: 4.96 X10(6)/MCL (ref 4.2–5.4)
T4 FREE SERPL-MCNC: 0.91 NG/DL (ref 0.7–1.48)
TSH SERPL-ACNC: 0.94 UIU/ML (ref 0.35–4.94)
WBC # BLD AUTO: 6.62 X10(3)/MCL (ref 4.5–11.5)

## 2025-07-31 PROCEDURE — 82533 TOTAL CORTISOL: CPT

## 2025-07-31 PROCEDURE — 84439 ASSAY OF FREE THYROXINE: CPT

## 2025-07-31 PROCEDURE — 84443 ASSAY THYROID STIM HORMONE: CPT

## 2025-07-31 PROCEDURE — 85027 COMPLETE CBC AUTOMATED: CPT

## 2025-07-31 PROCEDURE — 36415 COLL VENOUS BLD VENIPUNCTURE: CPT

## 2025-08-25 DIAGNOSIS — M47.26 OTHER SPONDYLOSIS WITH RADICULOPATHY, LUMBAR REGION: Primary | ICD-10-CM
